# Patient Record
Sex: FEMALE | Race: WHITE | HISPANIC OR LATINO | Employment: UNEMPLOYED | URBAN - METROPOLITAN AREA
[De-identification: names, ages, dates, MRNs, and addresses within clinical notes are randomized per-mention and may not be internally consistent; named-entity substitution may affect disease eponyms.]

---

## 2024-09-05 ENCOUNTER — TELEPHONE (OUTPATIENT)
Dept: OBSTETRICS AND GYNECOLOGY | Facility: CLINIC | Age: 24
End: 2024-09-05

## 2024-09-05 NOTE — TELEPHONE ENCOUNTER
Caller: LISA GREGORIO    Relationship: SELF- WITH [employer]TER     Best call back number: [phone]    What is the best time to reach you: ANY    Who are you requesting to speak with (clinical staff, provider,  specific staff member): FRONT     What was the call regarding: NEW OB- LMP 7/3/24. PT WANTS FEMALE PROVIDER ONLY. FIRST AVAILABLE NOT UNTIL 9/27/24. NOT WITHIN TIMEFRAME PER RT. UNABLE TO ARM TRANSFER; REQUESTING CALL BACK TO SCHEDULE

## 2024-10-15 ENCOUNTER — OFFICE VISIT (OUTPATIENT)
Dept: OBSTETRICS AND GYNECOLOGY | Facility: CLINIC | Age: 24
End: 2024-10-15
Payer: MEDICAID

## 2024-10-15 ENCOUNTER — TELEPHONE (OUTPATIENT)
Dept: OBSTETRICS AND GYNECOLOGY | Facility: CLINIC | Age: 24
End: 2024-10-15

## 2024-10-15 ENCOUNTER — HOSPITAL ENCOUNTER (INPATIENT)
Facility: HOSPITAL | Age: 24
LOS: 3 days | Discharge: HOME | DRG: 770 | End: 2024-10-18
Attending: OBSTETRICS & GYNECOLOGY | Admitting: STUDENT IN AN ORGANIZED HEALTH CARE EDUCATION/TRAINING PROGRAM
Payer: MEDICAID

## 2024-10-15 VITALS
SYSTOLIC BLOOD PRESSURE: 107 MMHG | BODY MASS INDEX: 22.28 KG/M2 | WEIGHT: 147 LBS | HEART RATE: 124 BPM | DIASTOLIC BLOOD PRESSURE: 76 MMHG | HEIGHT: 68 IN

## 2024-10-15 DIAGNOSIS — O03.4 INCOMPLETE ABORTION: Primary | ICD-10-CM

## 2024-10-15 DIAGNOSIS — O03.4 INCOMPLETE ABORTION: ICD-10-CM

## 2024-10-15 LAB
ABO GROUP BLD: NORMAL
ALBUMIN SERPL-MCNC: 3.2 G/DL (ref 3.5–5.2)
ALBUMIN/GLOB SERPL: 0.8 G/DL
ALP SERPL-CCNC: 125 U/L (ref 39–117)
ALT SERPL W P-5'-P-CCNC: 81 U/L (ref 1–33)
ANION GAP SERPL CALCULATED.3IONS-SCNC: 11.7 MMOL/L (ref 5–15)
AST SERPL-CCNC: 106 U/L (ref 1–32)
BILIRUB SERPL-MCNC: 0.7 MG/DL (ref 0–1.2)
BLD GP AB SCN SERPL QL: NEGATIVE
BUN SERPL-MCNC: 10 MG/DL (ref 6–20)
BUN/CREAT SERPL: 12 (ref 7–25)
CALCIUM SPEC-SCNC: 9.2 MG/DL (ref 8.6–10.5)
CHLORIDE SERPL-SCNC: 101 MMOL/L (ref 98–107)
CO2 SERPL-SCNC: 22.3 MMOL/L (ref 22–29)
CREAT SERPL-MCNC: 0.83 MG/DL (ref 0.57–1)
D-LACTATE SERPL-SCNC: 0.8 MMOL/L (ref 0.5–2)
DEPRECATED RDW RBC AUTO: 39 FL (ref 37–54)
EGFRCR SERPLBLD CKD-EPI 2021: 101.1 ML/MIN/1.73
ERYTHROCYTE [DISTWIDTH] IN BLOOD BY AUTOMATED COUNT: 12.1 % (ref 12.3–15.4)
GLOBULIN UR ELPH-MCNC: 4.1 GM/DL
GLUCOSE SERPL-MCNC: 116 MG/DL (ref 65–99)
HCG INTACT+B SERPL-ACNC: 31.3 MIU/ML
HCT VFR BLD AUTO: 32.5 % (ref 34–46.6)
HGB BLD-MCNC: 11.1 G/DL (ref 12–15.9)
MCH RBC QN AUTO: 30.3 PG (ref 26.6–33)
MCHC RBC AUTO-ENTMCNC: 34.2 G/DL (ref 31.5–35.7)
MCV RBC AUTO: 88.8 FL (ref 79–97)
PLATELET # BLD AUTO: 282 10*3/MM3 (ref 140–450)
PMV BLD AUTO: 9.9 FL (ref 6–12)
POTASSIUM SERPL-SCNC: 2.9 MMOL/L (ref 3.5–5.2)
PROT SERPL-MCNC: 7.3 G/DL (ref 6–8.5)
RBC # BLD AUTO: 3.66 10*6/MM3 (ref 3.77–5.28)
RH BLD: POSITIVE
SODIUM SERPL-SCNC: 135 MMOL/L (ref 136–145)
T&S EXPIRATION DATE: NORMAL
WBC NRBC COR # BLD AUTO: 17.52 10*3/MM3 (ref 3.4–10.8)

## 2024-10-15 PROCEDURE — 25010000002 CEFTRIAXONE PER 250 MG: Performed by: OBSTETRICS & GYNECOLOGY

## 2024-10-15 PROCEDURE — 80053 COMPREHEN METABOLIC PANEL: CPT | Performed by: OBSTETRICS & GYNECOLOGY

## 2024-10-15 PROCEDURE — 87040 BLOOD CULTURE FOR BACTERIA: CPT | Performed by: STUDENT IN AN ORGANIZED HEALTH CARE EDUCATION/TRAINING PROGRAM

## 2024-10-15 PROCEDURE — 86900 BLOOD TYPING SEROLOGIC ABO: CPT | Performed by: OBSTETRICS & GYNECOLOGY

## 2024-10-15 PROCEDURE — 85027 COMPLETE CBC AUTOMATED: CPT | Performed by: OBSTETRICS & GYNECOLOGY

## 2024-10-15 PROCEDURE — 86901 BLOOD TYPING SEROLOGIC RH(D): CPT | Performed by: OBSTETRICS & GYNECOLOGY

## 2024-10-15 PROCEDURE — 25810000003 LACTATED RINGERS PER 1000 ML: Performed by: OBSTETRICS & GYNECOLOGY

## 2024-10-15 PROCEDURE — 84702 CHORIONIC GONADOTROPIN TEST: CPT | Performed by: OBSTETRICS & GYNECOLOGY

## 2024-10-15 PROCEDURE — 83605 ASSAY OF LACTIC ACID: CPT | Performed by: STUDENT IN AN ORGANIZED HEALTH CARE EDUCATION/TRAINING PROGRAM

## 2024-10-15 PROCEDURE — 86850 RBC ANTIBODY SCREEN: CPT | Performed by: OBSTETRICS & GYNECOLOGY

## 2024-10-15 PROCEDURE — 99221 1ST HOSP IP/OBS SF/LOW 40: CPT | Performed by: OBSTETRICS & GYNECOLOGY

## 2024-10-15 RX ORDER — LIDOCAINE HYDROCHLORIDE 10 MG/ML
0.5 INJECTION, SOLUTION INFILTRATION; PERINEURAL ONCE AS NEEDED
Status: CANCELLED | OUTPATIENT
Start: 2024-10-15

## 2024-10-15 RX ORDER — POTASSIUM CHLORIDE 750 MG/1
40 TABLET, FILM COATED, EXTENDED RELEASE ORAL EVERY 4 HOURS
Status: DISCONTINUED | OUTPATIENT
Start: 2024-10-15 | End: 2024-10-16

## 2024-10-15 RX ORDER — HYDROXYZINE HYDROCHLORIDE 25 MG/1
25 TABLET, FILM COATED ORAL EVERY 8 HOURS PRN
COMMUNITY
Start: 2024-10-09 | End: 2024-10-28

## 2024-10-15 RX ORDER — HYDROCODONE BITARTRATE AND ACETAMINOPHEN 5; 325 MG/1; MG/1
1 TABLET ORAL EVERY 6 HOURS PRN
Status: DISCONTINUED | OUTPATIENT
Start: 2024-10-15 | End: 2024-10-18

## 2024-10-15 RX ORDER — IBUPROFEN 800 MG/1
800 TABLET, FILM COATED ORAL EVERY 8 HOURS PRN
COMMUNITY
Start: 2024-10-09 | End: 2024-10-19

## 2024-10-15 RX ORDER — PSEUDOEPHEDRINE HCL 30 MG
100 TABLET ORAL 2 TIMES DAILY PRN
COMMUNITY
Start: 2024-10-09 | End: 2024-10-28

## 2024-10-15 RX ORDER — DOCUSATE SODIUM 100 MG/1
100 CAPSULE, LIQUID FILLED ORAL 2 TIMES DAILY PRN
Status: DISCONTINUED | OUTPATIENT
Start: 2024-10-15 | End: 2024-10-18 | Stop reason: HOSPADM

## 2024-10-15 RX ORDER — DOCUSATE SODIUM 100 MG/1
100 CAPSULE, LIQUID FILLED ORAL 2 TIMES DAILY PRN
Status: CANCELLED | OUTPATIENT
Start: 2024-10-15

## 2024-10-15 RX ORDER — SODIUM CHLORIDE 0.9 % (FLUSH) 0.9 %
10 SYRINGE (ML) INJECTION AS NEEDED
Status: DISCONTINUED | OUTPATIENT
Start: 2024-10-15 | End: 2024-10-18 | Stop reason: HOSPADM

## 2024-10-15 RX ORDER — BISACODYL 10 MG
10 SUPPOSITORY, RECTAL RECTAL DAILY PRN
Status: CANCELLED | OUTPATIENT
Start: 2024-10-15

## 2024-10-15 RX ORDER — ONDANSETRON 4 MG/1
8 TABLET, ORALLY DISINTEGRATING ORAL EVERY 8 HOURS PRN
Status: DISCONTINUED | OUTPATIENT
Start: 2024-10-15 | End: 2024-10-18 | Stop reason: HOSPADM

## 2024-10-15 RX ORDER — SODIUM CHLORIDE 0.9 % (FLUSH) 0.9 %
10 SYRINGE (ML) INJECTION AS NEEDED
Status: CANCELLED | OUTPATIENT
Start: 2024-10-15

## 2024-10-15 RX ORDER — DEXTROSE MONOHYDRATE, SODIUM CHLORIDE, AND POTASSIUM CHLORIDE 50; 1.49; 4.5 G/1000ML; G/1000ML; G/1000ML
100 INJECTION, SOLUTION INTRAVENOUS CONTINUOUS
Status: DISPENSED | OUTPATIENT
Start: 2024-10-15 | End: 2024-10-16

## 2024-10-15 RX ORDER — SODIUM CHLORIDE, SODIUM LACTATE, POTASSIUM CHLORIDE, CALCIUM CHLORIDE 600; 310; 30; 20 MG/100ML; MG/100ML; MG/100ML; MG/100ML
100 INJECTION, SOLUTION INTRAVENOUS CONTINUOUS
Status: CANCELLED | OUTPATIENT
Start: 2024-10-15 | End: 2024-10-16

## 2024-10-15 RX ORDER — FERROUS SULFATE 325(65) MG
325 TABLET ORAL
COMMUNITY
Start: 2024-10-11 | End: 2024-10-28

## 2024-10-15 RX ORDER — ONDANSETRON 2 MG/ML
4 INJECTION INTRAMUSCULAR; INTRAVENOUS EVERY 8 HOURS PRN
Status: CANCELLED | OUTPATIENT
Start: 2024-10-15

## 2024-10-15 RX ORDER — LIDOCAINE HYDROCHLORIDE 10 MG/ML
0.5 INJECTION, SOLUTION INFILTRATION; PERINEURAL ONCE AS NEEDED
Status: DISCONTINUED | OUTPATIENT
Start: 2024-10-15 | End: 2024-10-18 | Stop reason: HOSPADM

## 2024-10-15 RX ORDER — ONDANSETRON 2 MG/ML
4 INJECTION INTRAMUSCULAR; INTRAVENOUS EVERY 8 HOURS PRN
Status: DISCONTINUED | OUTPATIENT
Start: 2024-10-15 | End: 2024-10-18 | Stop reason: HOSPADM

## 2024-10-15 RX ORDER — PNV NO.121/IRON/FOLIC ACID 28MG-0.8MG
1 TABLET ORAL DAILY
COMMUNITY
Start: 2024-10-09 | End: 2024-10-28

## 2024-10-15 RX ORDER — SODIUM CHLORIDE 9 MG/ML
40 INJECTION, SOLUTION INTRAVENOUS AS NEEDED
Status: ACTIVE | OUTPATIENT
Start: 2024-10-15 | End: 2024-10-16

## 2024-10-15 RX ORDER — IBUPROFEN 600 MG/1
600 TABLET, FILM COATED ORAL EVERY 6 HOURS PRN
Status: DISCONTINUED | OUTPATIENT
Start: 2024-10-15 | End: 2024-10-18 | Stop reason: HOSPADM

## 2024-10-15 RX ORDER — BISACODYL 10 MG
10 SUPPOSITORY, RECTAL RECTAL DAILY PRN
Status: DISCONTINUED | OUTPATIENT
Start: 2024-10-15 | End: 2024-10-18 | Stop reason: HOSPADM

## 2024-10-15 RX ORDER — SODIUM CHLORIDE, SODIUM LACTATE, POTASSIUM CHLORIDE, CALCIUM CHLORIDE 600; 310; 30; 20 MG/100ML; MG/100ML; MG/100ML; MG/100ML
100 INJECTION, SOLUTION INTRAVENOUS CONTINUOUS
Status: DISCONTINUED | OUTPATIENT
Start: 2024-10-15 | End: 2024-10-15

## 2024-10-15 RX ORDER — HYDROMORPHONE HYDROCHLORIDE 1 MG/ML
0.5 INJECTION, SOLUTION INTRAMUSCULAR; INTRAVENOUS; SUBCUTANEOUS
Status: DISCONTINUED | OUTPATIENT
Start: 2024-10-15 | End: 2024-10-18

## 2024-10-15 RX ORDER — IBUPROFEN 200 MG
600 TABLET ORAL EVERY 6 HOURS PRN
Status: CANCELLED | OUTPATIENT
Start: 2024-10-15

## 2024-10-15 RX ORDER — SODIUM CHLORIDE 0.9 % (FLUSH) 0.9 %
10 SYRINGE (ML) INJECTION EVERY 12 HOURS SCHEDULED
Status: DISCONTINUED | OUTPATIENT
Start: 2024-10-15 | End: 2024-10-18 | Stop reason: HOSPADM

## 2024-10-15 RX ORDER — ONDANSETRON 4 MG/1
8 TABLET, ORALLY DISINTEGRATING ORAL EVERY 8 HOURS PRN
Status: CANCELLED | OUTPATIENT
Start: 2024-10-15

## 2024-10-15 RX ORDER — SENNOSIDES 8.6 MG
650 CAPSULE ORAL EVERY 8 HOURS PRN
COMMUNITY
Start: 2024-10-09 | End: 2024-10-18 | Stop reason: HOSPADM

## 2024-10-15 RX ORDER — SODIUM CHLORIDE 0.9 % (FLUSH) 0.9 %
10 SYRINGE (ML) INJECTION EVERY 12 HOURS SCHEDULED
Status: CANCELLED | OUTPATIENT
Start: 2024-10-15

## 2024-10-15 RX ORDER — SODIUM CHLORIDE 9 MG/ML
40 INJECTION, SOLUTION INTRAVENOUS AS NEEDED
Status: CANCELLED | OUTPATIENT
Start: 2024-10-15 | End: 2024-10-16

## 2024-10-15 RX ADMIN — POTASSIUM CHLORIDE, DEXTROSE MONOHYDRATE AND SODIUM CHLORIDE 100 ML/HR: 150; 5; 450 INJECTION, SOLUTION INTRAVENOUS at 22:13

## 2024-10-15 RX ADMIN — CEFTRIAXONE 2000 MG: 2 INJECTION, POWDER, FOR SOLUTION INTRAMUSCULAR; INTRAVENOUS at 19:55

## 2024-10-15 RX ADMIN — Medication 10 ML: at 20:02

## 2024-10-15 RX ADMIN — IBUPROFEN 600 MG: 600 TABLET, FILM COATED ORAL at 17:11

## 2024-10-15 RX ADMIN — SODIUM CHLORIDE, POTASSIUM CHLORIDE, SODIUM LACTATE AND CALCIUM CHLORIDE 100 ML/HR: 600; 310; 30; 20 INJECTION, SOLUTION INTRAVENOUS at 16:37

## 2024-10-15 RX ADMIN — POTASSIUM CHLORIDE 40 MEQ: 750 TABLET, EXTENDED RELEASE ORAL at 22:14

## 2024-10-15 NOTE — H&P
H&P Note    Patient Identification:  Name: Martha Mckeon  Age: 24 y.o.  Sex: female  :  2000  MRN: [id number]                       Chief Complaint: Pelvic pain    History of Present Illness:   24-year-old lady who presented to the office after an episode of bleeding and pelvic pain.  She had a fever to 101 last night.  She was evaluated in the office by Dr. Monterroso.  There, her physical examination was concerning for endometritis or retained products.  The patient had significant tenderness overlying the uterus and her cervix was dilated between 1 and 2 cm.      I reviewed the patient's history.  On , she presented to the [facility] with pelvic pain and cramps.  There, she delivered a previable 15-week fetus.  The delivery note makes reference to easy delivery of the placenta after this.  After discharge, the patient had 1 to 2 days of heavy bleeding followed by resolution of her symptoms.  Yesterday, she experienced an additional episode of bleeding along with severe pain and fever.  This evening, the patient is awake and alert.  She denies any pain at this time, though she did have significant pain earlier.  Denies heavy bleeding, though she did have an episode earlier in the day.    Problem List:  [unfilled]  Past Medical History:  History reviewed. No pertinent past medical history.  Past Surgical History:  History reviewed. No pertinent surgical history.   Home Meds:  Medications Prior to Admission   Medication Sig Dispense Refill Last Dose/Taking    acetaminophen (TYLENOL) 650 MG 8 hr tablet Take 1 tablet by mouth Every 8 (Eight) Hours As Needed for Mild Pain, Moderate Pain or Headache.   Taking As Needed    docusate sodium 100 MG capsule Take 1 capsule by mouth 2 (Two) Times a Day As Needed (constipation).   Taking As Needed    FeroSul 325 (65 Fe) MG tablet Take 1 tablet by mouth Daily With Breakfast.   Taking    hydrOXYzine (ATARAX) 25 MG tablet Take 1 tablet by mouth Every 8  (Eight) Hours As Needed.   Taking As Needed    ibuprofen (ADVIL,MOTRIN) 800 MG tablet Take 1 tablet by mouth Every 8 (Eight) Hours As Needed.   Taking As Needed    Prenatal Vit-Fe Fumarate-FA ( Prenatal Multivitamins) 28-0.8 MG tablet Take 1 tablet by mouth Daily.   Taking     Current Meds:   [unfilled]  Allergies:  Allergies   Allergen Reactions    Penicillins Rash     Immunizations:  Immunization History   Administered Date(s) Administered    BCG 2000    Hepatitis B Adult/Adolescent IM 2000, 2000, 2000    HiB 2000, 2000, 02/15/2001, 2001    IPV 2001, 2001    MMR 2000, 2000, 2000, 2001    Meningococcal Polysaccharide 2000, 2000    Typhoid, Unspecified 10/25/2017     Social History:   Social History     Tobacco Use    Smoking status: Never    Smokeless tobacco: Never   Substance Use Topics    Alcohol use: Not Currently      Family History:  Family History   Problem Relation Age of Onset    No Known Problems Father     No Known Problems Mother     Breast cancer Neg Hx     Ovarian cancer Neg Hx     Uterine cancer Neg Hx     Colon cancer Neg Hx     Deep vein thrombosis Neg Hx     Pulmonary embolism Neg Hx         Review of Systems  This is positive for abdominal and pelvic pain.  Positive for bleeding, currently resolved.  Positive for fever, currently resolved.  Negative for nausea and vomiting.  All other systems are reviewed and are negative.    Objective:  tMax 24 hrs: Temp (24hrs), Av.2 °F (36.2 °C), Min:97.2 °F (36.2 °C), Max:97.2 °F (36.2 °C)    Vitals Ranges:   Temp:  [97.2 °F (36.2 °C)] 97.2 °F (36.2 °C)  Heart Rate:  [105-124] 107  Resp:  [18] 18  BP: (101-113)/(70-79) 101/70  Intake and Output Last 3 Shifts:   No intake/output data recorded.    Exam:     General Appearance:    Alert, cooperative, no distress, appears stated age   Head:    Normocephalic, without obvious abnormality, atraumatic   Back:     Symmetric,  no curvature, ROM normal, no CVA tenderness   Lungs:     Clear to auscultation bilaterally, respirations unlabored   Chest Wall:    No tenderness or deformity    Heart:    Regular rate and rhythm, S1 and S2 normal, no murmur, rub   or gallop       Abdomen:   Soft, nondistended.  There is no upper abdominal tenderness.  No right upper quadrant or left upper quadrant tenderness.  No CVA tenderness.  There is moderate tenderness overlying the uterine fundus and suprapubic area.   Genitalia:  Pelvic examination this afternoon by Dr. Verma showed tenderness overlying the uterus.  The cervix was open approximately 1 cm with some motion tenderness.   Rectal:  Not examined today   Extremities:   Extremities normal, atraumatic, no cyanosis or edema   Skin:   Skin color, texture, turgor normal, no rashes or lesions   Lymph nodes:   Cervical, supraclavicular, and axillary nodes normal       Data Review:    Lab Results (last 24 hours)       ** No results found for the last 24 hours. **          Assessment:    Incomplete       6 days postdelivery of a previable 15-week fetus.  The patient has endometritis with possible retained products of conception.  I counseled the patient and answered her questions.  A Ecuadorean  via iPad was instrumental in communication.  We discussed the patient's current condition and its pathophysiology along with my plan for management.  I recommend IV antibiotics.  The patient reports a penicillin allergy.  For this reason, we will use ceftriaxone.  Also, we will check a CBC, chemistry and blood cultures.  We will continue to observe the patient in the hospital overnight.  Labs will be repeated in the morning.  After the patient's endometritis has been adequately treated, if it appears that retained products are part of the patient's problem, either a D&C or Methergine could be used to evacuate the uterus.  If it appears that this is endometritis without retained products,  treatment with antibiotics could be sufficient.  I answered the patient's questions and she agrees with these recommendations.        Yasmani Long MD  10/15/2024

## 2024-10-15 NOTE — PROGRESS NOTES
"Subjective    is a 24 y.o. female following up from [facility]. LMP 2024. She started bleeding 10/09/2024. Still bleeding today with intense pain. She had a fever of 101 yesterday.     Chief Complaint   Patient presents with    Miscarriage        HPI    24 y.o.    LNMP 24, CGA 15 weeks  Started bleeding and pain last week   Confirmed miscarriage in ED last week per patient   Only records show visit earlier in pregnancy that confirmed IUP   Routine prenatal panel at this point   In severe pain and still bleeding     History reviewed. No pertinent past medical history.    History reviewed. No pertinent surgical history.    No current outpatient medications on file.    Allergies   Allergen Reactions    Penicillins Rash     Social History     Tobacco Use    Smoking status: Never   Vaping Use    Vaping status: Never Used   Substance Use Topics    Alcohol use: Not Currently    Drug use: Never     Family History   Problem Relation Age of Onset    No Known Problems Father     No Known Problems Mother     Breast cancer Neg Hx     Ovarian cancer Neg Hx     Uterine cancer Neg Hx     Colon cancer Neg Hx     Deep vein thrombosis Neg Hx     Pulmonary embolism Neg Hx            Review of Systems   Constitutional:  Positive for fever.   Respiratory:  Negative for chest tightness and shortness of breath.    Gastrointestinal:  Positive for abdominal pain.   Genitourinary:  Positive for pelvic pain and pelvic pressure.   All other systems reviewed and are negative.       Objective   /76   Pulse (!) 124   Ht 173 cm (68.11\")   Wt 66.7 kg (147 lb)   LMP 2024 (Exact Date)   BMI 22.28 kg/m²   Physical Exam  Constitutional:       General: She is in acute distress.      Appearance: She is well-developed and normal weight.   Genitourinary:      Vulva normal.      Right Labia: No lesions or Bartholin's cyst.     Left Labia: No lesions or Bartholin's cyst.     No inguinal adenopathy present in the right or left " side.     No vaginal discharge or bleeding.        Right Adnexa: not tender, not full and no mass present.     Left Adnexa: not tender, not full and no mass present.     Cervix is parous (open 1 cm).      Cervical motion tenderness present.      No cervical friability.      Uterus is enlarged and tender.      No uterine mass detected.     Uterus is anteverted.   HENT:      Head: Normocephalic and atraumatic.      Nose: Nose normal.   Eyes:      Conjunctiva/sclera: Conjunctivae normal.      Pupils: Pupils are equal, round, and reactive to light.   Neck:      Thyroid: No thyromegaly.   Cardiovascular:      Rate and Rhythm: Normal rate and regular rhythm.      Heart sounds: Normal heart sounds. No murmur heard.  Pulmonary:      Effort: Pulmonary effort is normal. No respiratory distress.      Breath sounds: Normal breath sounds.   Abdominal:      General: Abdomen is flat. There is no distension.      Palpations: Abdomen is soft.      Tenderness: There is no abdominal tenderness.   Musculoskeletal:         General: No deformity. Normal range of motion.      Cervical back: Normal range of motion and neck supple.      Right lower leg: No edema.      Left lower leg: No edema.   Lymphadenopathy:      Lower Body: No right inguinal adenopathy. No left inguinal adenopathy.   Neurological:      Mental Status: She is alert and oriented to person, place, and time.   Skin:     General: Skin is warm and dry.      Findings: No erythema.   Psychiatric:         Behavior: Behavior normal.         Thought Content: Thought content normal.         Judgment: Judgment normal.   Vitals reviewed. Exam conducted with a chaperone present.          Assessment/Plan   Diagnoses and all orders for this visit:    1. Incomplete  (Primary)  -     sodium chloride 0.9 % flush 10 mL  -     sodium chloride 0.9 % flush 10 mL  -     sodium chloride 0.9 % infusion 40 mL  -     lidocaine (XYLOCAINE) 1 % injection 0.5 mL  -     ondansetron ODT  (ZOFRAN-ODT) disintegrating tablet 8 mg  -     ondansetron (ZOFRAN) injection 4 mg  -     docusate sodium (COLACE) capsule 100 mg  -     bisacodyl (DULCOLAX) suppository 10 mg  -     ibuprofen (ADVIL,MOTRIN) tablet 600 mg  -     lactated ringers infusion    Other orders  -     Admit To Obstetrics Inpatient; Standing  -     Code Status and Medical Interventions: CPR (Attempt to Resuscitate); Full Support; Standing  -     Place Sequential Compression Device; Standing  -     Maintain Sequential Compression Device; Standing  -     Vital Signs q 4 while awake; Standing  -     Notify Provider (Specified); Standing  -     Notify Provider of Tachysystole (Per Hospital Algorithm); Standing  -     Notify Provider if Membranes Ruptured, Bleeding Greater Than 1 Pad Per Hour, Fetal Heart Tone Abnormality or Severe Pain; Standing  -     CBC (No Diff); Standing  -     Type & Screen; Standing  -     Insert Peripheral IV; Standing  -     Saline Lock & Maintain IV Access; Standing  -     Up Ad Ramona; Standing  -     Diet: Regular/House; Fluid Consistency: Thin (IDDSI 0); Standing  -     Comprehensive Metabolic Panel; Standing  -     Comprehensive Metabolic Panel; Standing  -     hCG, Quantitative, Pregnancy; Standing    Exam very concerning   She is tender and in lower abdomen with cervix dilated and 1.8 cm on exam with heterogenous material   Suspect she could be septic ab in process with fever.     Recommended inpatient evaluation for IV antibiotics.   I am sending for direct admit with labs  It would not let me start antibiotics.     Would need to cool off before considering D&C of material in uterus, maybe can do misoprostol in meanwhile.     Generally concerned with overall clinical picture  Orders placed to start for Med/Surg bed and then will need to do IV antibiotics    Consider treatment from there.      I have notified my staff to make direct admit to the hospital.   I have notified my partners who are covering the hospital  today     Nicolás Verma MD   10/15/2024  13:46 EDT

## 2024-10-16 LAB
ALBUMIN SERPL-MCNC: 2.8 G/DL (ref 3.5–5.2)
ALBUMIN/GLOB SERPL: 0.7 G/DL
ALP SERPL-CCNC: 123 U/L (ref 39–117)
ALT SERPL W P-5'-P-CCNC: 87 U/L (ref 1–33)
ANION GAP SERPL CALCULATED.3IONS-SCNC: 7.3 MMOL/L (ref 5–15)
AST SERPL-CCNC: 101 U/L (ref 1–32)
BASOPHILS # BLD AUTO: 0.04 10*3/MM3 (ref 0–0.2)
BASOPHILS NFR BLD AUTO: 0.2 % (ref 0–1.5)
BILIRUB SERPL-MCNC: 0.5 MG/DL (ref 0–1.2)
BUN SERPL-MCNC: 7 MG/DL (ref 6–20)
BUN/CREAT SERPL: 9.9 (ref 7–25)
CALCIUM SPEC-SCNC: 8.9 MG/DL (ref 8.6–10.5)
CHLORIDE SERPL-SCNC: 105 MMOL/L (ref 98–107)
CO2 SERPL-SCNC: 24.7 MMOL/L (ref 22–29)
CREAT SERPL-MCNC: 0.71 MG/DL (ref 0.57–1)
D-LACTATE SERPL-SCNC: 0.6 MMOL/L (ref 0.5–2)
DEPRECATED RDW RBC AUTO: 39 FL (ref 37–54)
EGFRCR SERPLBLD CKD-EPI 2021: 121.9 ML/MIN/1.73
EOSINOPHIL # BLD AUTO: 0.06 10*3/MM3 (ref 0–0.4)
EOSINOPHIL NFR BLD AUTO: 0.4 % (ref 0.3–6.2)
ERYTHROCYTE [DISTWIDTH] IN BLOOD BY AUTOMATED COUNT: 12.2 % (ref 12.3–15.4)
GLOBULIN UR ELPH-MCNC: 4 GM/DL
GLUCOSE SERPL-MCNC: 102 MG/DL (ref 65–99)
HCT VFR BLD AUTO: 31.4 % (ref 34–46.6)
HGB BLD-MCNC: 10.8 G/DL (ref 12–15.9)
IMM GRANULOCYTES # BLD AUTO: 0.23 10*3/MM3 (ref 0–0.05)
IMM GRANULOCYTES NFR BLD AUTO: 1.4 % (ref 0–0.5)
LYMPHOCYTES # BLD AUTO: 1.6 10*3/MM3 (ref 0.7–3.1)
LYMPHOCYTES NFR BLD AUTO: 9.4 % (ref 19.6–45.3)
MCH RBC QN AUTO: 30.6 PG (ref 26.6–33)
MCHC RBC AUTO-ENTMCNC: 34.4 G/DL (ref 31.5–35.7)
MCV RBC AUTO: 89 FL (ref 79–97)
MONOCYTES # BLD AUTO: 1.21 10*3/MM3 (ref 0.1–0.9)
MONOCYTES NFR BLD AUTO: 7.1 % (ref 5–12)
NEUTROPHILS NFR BLD AUTO: 13.8 10*3/MM3 (ref 1.7–7)
NEUTROPHILS NFR BLD AUTO: 81.5 % (ref 42.7–76)
NRBC BLD AUTO-RTO: 0 /100 WBC (ref 0–0.2)
PLATELET # BLD AUTO: 270 10*3/MM3 (ref 140–450)
PMV BLD AUTO: 9.8 FL (ref 6–12)
POTASSIUM SERPL-SCNC: 3.9 MMOL/L (ref 3.5–5.2)
PROCALCITONIN SERPL-MCNC: 0.21 NG/ML (ref 0–0.25)
PROT SERPL-MCNC: 6.8 G/DL (ref 6–8.5)
RBC # BLD AUTO: 3.53 10*6/MM3 (ref 3.77–5.28)
SODIUM SERPL-SCNC: 137 MMOL/L (ref 136–145)
WBC NRBC COR # BLD AUTO: 16.94 10*3/MM3 (ref 3.4–10.8)

## 2024-10-16 PROCEDURE — 25010000002 ONDANSETRON PER 1 MG: Performed by: OBSTETRICS & GYNECOLOGY

## 2024-10-16 PROCEDURE — 25810000003 SODIUM CHLORIDE 0.9 % SOLUTION: Performed by: OBSTETRICS & GYNECOLOGY

## 2024-10-16 PROCEDURE — 85025 COMPLETE CBC W/AUTO DIFF WBC: CPT | Performed by: OBSTETRICS & GYNECOLOGY

## 2024-10-16 PROCEDURE — 25010000002 METRONIDAZOLE 500 MG/100ML SOLUTION: Performed by: INTERNAL MEDICINE

## 2024-10-16 PROCEDURE — 99223 1ST HOSP IP/OBS HIGH 75: CPT | Performed by: INTERNAL MEDICINE

## 2024-10-16 PROCEDURE — 25010000002 CEFTRIAXONE PER 250 MG: Performed by: INTERNAL MEDICINE

## 2024-10-16 PROCEDURE — 25010000002 METRONIDAZOLE 500 MG/100ML SOLUTION: Performed by: OTOLARYNGOLOGY

## 2024-10-16 PROCEDURE — 80053 COMPREHEN METABOLIC PANEL: CPT | Performed by: OBSTETRICS & GYNECOLOGY

## 2024-10-16 PROCEDURE — 99232 SBSQ HOSP IP/OBS MODERATE 35: CPT | Performed by: OBSTETRICS & GYNECOLOGY

## 2024-10-16 PROCEDURE — 83605 ASSAY OF LACTIC ACID: CPT | Performed by: OBSTETRICS & GYNECOLOGY

## 2024-10-16 PROCEDURE — 84145 PROCALCITONIN (PCT): CPT | Performed by: INTERNAL MEDICINE

## 2024-10-16 RX ORDER — METHYLERGONOVINE MALEATE 0.2 MG/1
200 TABLET ORAL EVERY 6 HOURS SCHEDULED
Status: COMPLETED | OUTPATIENT
Start: 2024-10-16 | End: 2024-10-17

## 2024-10-16 RX ORDER — METRONIDAZOLE 500 MG/100ML
500 INJECTION, SOLUTION INTRAVENOUS EVERY 8 HOURS
Status: COMPLETED | OUTPATIENT
Start: 2024-10-16 | End: 2024-10-18

## 2024-10-16 RX ORDER — METRONIDAZOLE 500 MG/100ML
500 INJECTION, SOLUTION INTRAVENOUS EVERY 12 HOURS
Status: DISCONTINUED | OUTPATIENT
Start: 2024-10-16 | End: 2024-10-16

## 2024-10-16 RX ADMIN — METRONIDAZOLE 500 MG: 500 INJECTION, SOLUTION INTRAVENOUS at 12:32

## 2024-10-16 RX ADMIN — METHYLERGONOVINE MALEATE 200 MCG: 0.2 TABLET ORAL at 18:28

## 2024-10-16 RX ADMIN — CEFTRIAXONE 2000 MG: 2 INJECTION, POWDER, FOR SOLUTION INTRAMUSCULAR; INTRAVENOUS at 19:22

## 2024-10-16 RX ADMIN — SODIUM CHLORIDE 500 ML: 9 INJECTION, SOLUTION INTRAVENOUS at 03:36

## 2024-10-16 RX ADMIN — ONDANSETRON 4 MG: 2 INJECTION INTRAMUSCULAR; INTRAVENOUS at 03:40

## 2024-10-16 RX ADMIN — Medication 10 ML: at 20:13

## 2024-10-16 RX ADMIN — METRONIDAZOLE 500 MG: 500 INJECTION, SOLUTION INTRAVENOUS at 20:13

## 2024-10-16 RX ADMIN — IBUPROFEN 600 MG: 600 TABLET, FILM COATED ORAL at 14:30

## 2024-10-16 RX ADMIN — IBUPROFEN 600 MG: 600 TABLET, FILM COATED ORAL at 02:57

## 2024-10-16 RX ADMIN — METRONIDAZOLE 500 MG: 500 INJECTION, SOLUTION INTRAVENOUS at 04:42

## 2024-10-16 RX ADMIN — METHYLERGONOVINE MALEATE 200 MCG: 0.2 TABLET ORAL at 12:33

## 2024-10-16 RX ADMIN — POTASSIUM CHLORIDE 20 MEQ: 750 TABLET, EXTENDED RELEASE ORAL at 02:57

## 2024-10-16 NOTE — PLAN OF CARE
Goal Outcome Evaluation:           Progress: improving  Outcome Evaluation: spoke with pt via  ipad, speaks Romansh only, ID consulted, iv abx given as ordered, Methergine given - pt passed some brown/bloody draiange and some tissue, iv saline locked, tolerating regular diet, Motrin given for cramping x1 with relief, vss and afebrile

## 2024-10-16 NOTE — PLAN OF CARE
Goal Outcome Evaluation:           Progress: no change  Outcome Evaluation: temp max of 101F.  Motrin given. Blood culture collected last night.  Started on IV rocephin. Dr Dos Santos added Flagyl after she developed fever early this AM.  lactic was WNL.  KCL level last night was 2.9 , unable to finish doses because of nausea but her KCL level this AM came up to 3.9 this am.  500 cc bolus also given.  minimal amount of vaginal bleeding. denies pain  Pt is Comoran speaking.

## 2024-10-16 NOTE — PAYOR COMM NOTE
"Lisa Gregorio (24 y.o. Female)    PLEASE SEE ATTACHED FOR INPT AUTH  REF#[id number]  PLEASE CALL EDMOND COHN RN/ DEPT [phone] FAX  DEPT [phone]  THANK YOU   EDMOND COHN RN  [facility]      Date of Birth   2000    Social Security Number       Address   [address] [facility] [address]    Home Phone   [phone]    MRN   [id number]       Jehovah's witness   None    Marital Status   Single                            Admission Date   10/15/24    Admission Type   Urgent    Admitting Provider   Emily Martinez MD    Attending Provider   Nicolás Verma MD    Department, Room/Bed   [address], \[facility] [place] [facility]\""/1       Discharge Date       Discharge Disposition       Discharge Destination                                 Attending Provider: Nicolás Verma MD    Allergies: Penicillins    Isolation: None   Infection: None   Code Status: CPR    Ht: 173 cm (68.11\")   Wt: 66.9 kg (147 lb 7.8 oz)    Admission Cmt: None   Principal Problem: Incomplete  [O03.4]                   Active Insurance as of 10/15/2024       Primary Coverage       Payor Plan Insurance Group Employer/Plan Group    [employer]HE[employer] MEDICAID [place] MEDICAID [id number]       Payor Plan Address Payor Plan Phone Number Payor Plan Fax Number Effective Dates    [address] [address] [phone]  2024 - None Entered    [facility] [id number]         Subscriber Name Subscriber Birth Date Member ID       LISA GREGORIO 2000 [id number]                     Emergency Contacts        (Rel.) Home Phone Work Phone Mobile Phone    SUSHANT SOUTH (Friend) [phone] -- --              [place]: NP [id number]  Tax ID [id number]     History & Physical        Yasmani Long MD at 10/15/24 1916          H&P Note    Patient Identification:  Name: Lisa Gregorio  Age: 24 y.o.  Sex: female  :  2000  MRN: [id number]                       Chief Complaint: " Pelvic pain    History of Present Illness:   24-year-old lady who presented to the office after an episode of bleeding and pelvic pain.  She had a fever to 101 last night.  She was evaluated in the office by Dr. Monterroso.  There, her physical examination was concerning for endometritis or retained products.  The patient had significant tenderness overlying the uterus and her cervix was dilated between 1 and 2 cm.      I reviewed the patient's history.  On October 9, she presented to the [facility] [place] [place] with pelvic pain and cramps.  There, she delivered a previable 15-week fetus.  The delivery note makes reference to easy delivery of the placenta after this.  After discharge, the patient had 1 to 2 days of heavy bleeding followed by resolution of her symptoms.  Yesterday, she experienced an additional episode of bleeding along with severe pain and fever.  This evening, the patient is awake and alert.  She denies any pain at this time, though she did have significant pain earlier.  Denies heavy bleeding, though she did have an episode earlier in the day.    Problem List:  [unfilled]  Past Medical History:  History reviewed. No pertinent past medical history.  Past Surgical History:  History reviewed. No pertinent surgical history.   Home Meds:  Medications Prior to Admission   Medication Sig Dispense Refill Last Dose/Taking    acetaminophen (TYLENOL) 650 MG 8 hr tablet Take 1 tablet by mouth Every 8 (Eight) Hours As Needed for Mild Pain, Moderate Pain or Headache.   Taking As Needed    docusate sodium 100 MG capsule Take 1 capsule by mouth 2 (Two) Times a Day As Needed (constipation).   Taking As Needed    FeroSul 325 (65 Fe) MG tablet Take 1 tablet by mouth Daily With Breakfast.   Taking    hydrOXYzine (ATARAX) 25 MG tablet Take 1 tablet by mouth Every 8 (Eight) Hours As Needed.   Taking As Needed    ibuprofen (ADVIL,MOTRIN) 800 MG tablet Take 1 tablet by mouth Every 8 (Eight) Hours As Needed.   Taking As Needed     Prenatal Vit-Fe Fumarate-FA ( Prenatal Multivitamins) 28-0.8 MG tablet Take 1 tablet by mouth Daily.   Taking     Current Meds:   [unfilled]  Allergies:  Allergies   Allergen Reactions    Penicillins Rash     Immunizations:  Immunization History   Administered Date(s) Administered    BCG 2000    Hepatitis B Adult/Adolescent IM 2000, 2000, 2000    HiB 2000, 2000, 02/15/2001, 2001    IPV 2001, 2001    MMR 2000, 2000, 2000, 2001    Meningococcal Polysaccharide 2000, 2000    Typhoid, Unspecified 10/25/2017     Social History:   Social History     Tobacco Use    Smoking status: Never    Smokeless tobacco: Never   Substance Use Topics    Alcohol use: Not Currently      Family History:  Family History   Problem Relation Age of Onset    No Known Problems Father     No Known Problems Mother     Breast cancer Neg Hx     Ovarian cancer Neg Hx     Uterine cancer Neg Hx     Colon cancer Neg Hx     Deep vein thrombosis Neg Hx     Pulmonary embolism Neg Hx         Review of Systems  This is positive for abdominal and pelvic pain.  Positive for bleeding, currently resolved.  Positive for fever, currently resolved.  Negative for nausea and vomiting.  All other systems are reviewed and are negative.    Objective:  tMax 24 hrs: Temp (24hrs), Av.2 °F (36.2 °C), Min:97.2 °F (36.2 °C), Max:97.2 °F (36.2 °C)    Vitals Ranges:   Temp:  [97.2 °F (36.2 °C)] 97.2 °F (36.2 °C)  Heart Rate:  [105-124] 107  Resp:  [18] 18  BP: (101-113)/(70-79) 101/70  Intake and Output Last 3 Shifts:   No intake/output data recorded.    Exam:     General Appearance:    Alert, cooperative, no distress, appears stated age   Head:    Normocephalic, without obvious abnormality, atraumatic   Back:     Symmetric, no curvature, ROM normal, no CVA tenderness   Lungs:     Clear to auscultation bilaterally, respirations unlabored   Chest Wall:    No tenderness or deformity     Heart:    Regular rate and rhythm, S1 and S2 normal, no murmur, rub   or gallop       Abdomen:   Soft, nondistended.  There is no upper abdominal tenderness.  No right upper quadrant or left upper quadrant tenderness.  No CVA tenderness.  There is moderate tenderness overlying the uterine fundus and suprapubic area.   Genitalia:  Pelvic examination this afternoon by Dr. Verma showed tenderness overlying the uterus.  The cervix was open approximately 1 cm with some motion tenderness.   Rectal:  Not examined today   Extremities:   Extremities normal, atraumatic, no cyanosis or edema   Skin:   Skin color, texture, turgor normal, no rashes or lesions   Lymph nodes:   Cervical, supraclavicular, and axillary nodes normal       Data Review:    Lab Results (last 24 hours)       ** No results found for the last 24 hours. **          Assessment:    Incomplete       6 days postdelivery of a previable 15-week fetus.  The patient has endometritis with possible retained products of conception.  I counseled the patient and answered her questions.  A Romansh  via iPad was instrumental in communication.  We discussed the patient's current condition and its pathophysiology along with my plan for management.  I recommend IV antibiotics.  The patient reports a penicillin allergy.  For this reason, we will use ceftriaxone.  Also, we will check a CBC, chemistry and blood cultures.  We will continue to observe the patient in the hospital overnight.  Labs will be repeated in the morning.  After the patient's endometritis has been adequately treated, if it appears that retained products are part of the patient's problem, either a D&C or Methergine could be used to evacuate the uterus.  If it appears that this is endometritis without retained products, treatment with antibiotics could be sufficient.  I answered the patient's questions and she agrees with these recommendations.        Yasmani Long,  MD  10/15/2024     Electronically signed by Yasmani Long MD at 10/15/24 1922       Emergency Department Notes    No notes of this type exist for this encounter.       Medication Administration Report for Martha Mckeon as of 10/16/24 through 10/16/24    Given  Hold  Not Given  Due  Canceled Entry  Other Actions  Time  Time  (Time)  Time  Time-Action    Discontinued  Completed  Future  MAR Hold  Linked    Medications  10/16/24    bisacodyl (DULCOLAX) suppository 10 mg  Dose: 10 mg  Freq: Daily PRN Route: RE  PRN Reason: Constipation  Start: 10/15/24 1612    Hold for diarrhea  cefTRIAXone (ROCEPHIN) 2,000 mg in sodium chloride 0.9 % 100 mL MBP  Dose: 2,000 mg  Freq: Every 24 Hours Route: IV  Indications Comment: endometritis  Start: 10/2000 End: 10/19/24 1147    Admin Instructions:  Swallow whole.  Do not open, crush, or chew capsule.    HYDROcodone-acetaminophen (NORCO) 5-325 MG per tablet 1 tablet  Dose: 1 tablet  Freq: Every 6 Hours PRN Route: PO  PRN Reason: Moderate Pain  Start: 10/15/24 1715 End: 10/20/24 1714    Based on patient request - if ordered for moderate or severe pain, provider allows for administration of a medication prescribed for a lower pain scale.  [JEROD]    Do not exceed 4 grams of acetaminophen in a 24 hr period. Max dose of 2gm for AST/ALT greater than 120 units/L        If given for pain, use the following pain scale:   Mild Pain = Pain Score of 1-3, CPOT 1-2  Moderate Pain = Pain Score of 4-6, CPOT 3-4  Severe Pain = Pain Score of 7-10, CPOT 5-8    HYDROmorphone (DILAUDID) injection 0.5 mg  Dose: 0.5 mg  Freq: Every 2 Hours PRN Route: IV  PRN Reason: Severe Pain  Start: 10/15/24 1715 End: 10/20/24 1714  Admin Instructions:     Physician Progress Notes (last 24 hours)        Yasmani Long MD at 10/16/24 0905               DAILY PROGRESS NOTE    Patient Identification:  Name: Martha Mckeon  Age: 24 y.o.  Sex: female  :  2000  MRN: [id number]                Subjective:  Interval History: 24-year-old lady was admitted 6 days out from a spontaneous vaginal delivery of a viable 15-week fetus.  She had pain and fever.  Concern was endometritis versus endometritis with retained products of conception.      The patient was admitted and started on IV antibiotics.  She had a fever to 101 at 2:00 this morning.  She is now afebrile.  She reports passing some tissue yesterday afternoon with minimal bleeding since then.  Her pain has improved.      Objective:    Scheduled Meds:cefTRIAXone, 2,000 mg, Intravenous, Q24H  methylergonovine, 200 mcg, Oral, Q6H  metroNIDAZOLE, 500 mg, Intravenous, Q12H  sodium chloride, 10 mL, Intravenous, Q12H      Continuous Infusions:   PRN Meds:  bisacodyl    docusate sodium    HYDROcodone-acetaminophen    HYDROmorphone    ibuprofen    influenza vaccine    lidocaine    ondansetron ODT **OR** ondansetron    Potassium Replacement - Follow Nurse / BPA Driven Protocol    sodium chloride    Vital signs in last 24 hours:  Temp:  [97.2 °F (36.2 °C)-101 °F (38.3 °C)] 98.9 °F (37.2 °C)  Heart Rate:  [] 97  Resp:  [18] 18  BP: ()/(59-79) 96/59    Intake/Output:    Intake/Output Summary (Last 24 hours) at 10/16/2024 0938  Last data filed at 10/16/2024 0933  Gross per 24 hour   Intake 1884.33 ml   Output 1020 ml   Net 864.33 ml       Exam:  General Appearance:    Alert, cooperative, no distress, appears stated age   Lungs:     Clear to auscultation bilaterally, respirations unlabored    Heart:    Regular rate and rhythm, S1 and S2 normal, no murmur, rub   or gallop   Abdomen:   Soft, nondistended.  There is no epigastric tenderness.  No right or left lower quadrant tenderness.    Pelvic examination reveals normal female external genitalia.  There is minimal old blood in the vaginal vault.  The cervix is closed.  The uterus is slightly enlarged and mildly tender.   Extremities: Equal in size   Skin:   Skin color, texture, turgor normal, no rashes  or lesions        Data Review:    Lab Results (last 24 hours)       Procedure Component Value Units Date/Time    Comprehensive Metabolic Panel [[id number]]  (Abnormal) Collected: 10/16/24 0333    Specimen: Blood Updated: 10/16/24 0423     Glucose 102 mg/dL      BUN 7 mg/dL      Creatinine 0.71 mg/dL      Sodium 137 mmol/L      Potassium 3.9 mmol/L      Chloride 105 mmol/L      CO2 24.7 mmol/L      Calcium 8.9 mg/dL      Total Protein 6.8 g/dL      Albumin 2.8 g/dL      ALT (SGPT) 87 U/L      AST (SGOT) 101 U/L      Alkaline Phosphatase 123 U/L      Total Bilirubin 0.5 mg/dL      Globulin 4.0 gm/dL      A/G Ratio 0.7 g/dL      BUN/Creatinine Ratio 9.9     Anion Gap 7.3 mmol/L      eGFR 121.9 mL/min/1.73     Narrative:      GFR Normal >60  Chronic Kidney Disease <60  Kidney Failure <15      Lactic Acid, Plasma [[id number]]  (Normal) Collected: 10/16/24 0333    Specimen: Blood Updated: 10/16/24 0418     Lactate 0.6 mmol/L     CBC & Differential [[id number]]  (Abnormal) Collected: 10/16/24 0333    Specimen: Blood Updated: 10/16/24 0403    Narrative:      The following orders were created for panel order CBC & Differential.  Procedure                               Abnormality         Status                     ---------                               -----------         ------                     CBC Auto Differential[[id number]]        Abnormal            Final result                 Please view results for these tests on the individual orders.    CBC Auto Differential [[id number]]  (Abnormal) Collected: 10/16/24 0333    Specimen: Blood Updated: 10/16/24 0403     WBC 16.94 10*3/mm3      RBC 3.53 10*6/mm3      Hemoglobin 10.8 g/dL      Hematocrit 31.4 %      MCV 89.0 fL      MCH 30.6 pg      MCHC 34.4 g/dL      RDW 12.2 %      RDW-SD 39.0 fl      MPV 9.8 fL      Platelets 270 10*3/mm3      Neutrophil % 81.5 %      Lymphocyte % 9.4 %      Monocyte % 7.1 %      Eosinophil % 0.4 %      Basophil % 0.2 %      Immature Grans % 1.4 %       Neutrophils, Absolute 13.80 10*3/mm3      Lymphocytes, Absolute 1.60 10*3/mm3      Monocytes, Absolute 1.21 10*3/mm3      Eosinophils, Absolute 0.06 10*3/mm3      Basophils, Absolute 0.04 10*3/mm3      Immature Grans, Absolute 0.23 10*3/mm3      nRBC 0.0 /100 WBC     hCG, Quantitative, Pregnancy [[id number]] Collected: 10/15/24 1951    Specimen: Blood Updated: 10/15/24 2031     HCG Quantitative 31.30 mIU/mL     Narrative:      HCG Ranges by Gestational Age    Females - non-pregnant premenopausal   </= 1mIU/mL HCG  Females - postmenopausal               </= 7mIU/mL HCG    3 Weeks       5.4   -      72 mIU/mL  4 Weeks      10.2   -     708 mIU/mL  5 Weeks       217   -   8,245 mIU/mL  6 Weeks       152   -  32,177 mIU/mL  7 Weeks     4,059   - 153,767 mIU/mL  8 Weeks    31,366   - 149,094 mIU/mL  9 Weeks    59,109   - 135,901 mIU/mL  10 Weeks   44,186   - 170,409 mIU/mL  12 Weeks   27,107   - 201,615 mIU/mL  14 Weeks   24,302   -  93,646 mIU/mL  15 Weeks   12,540   -  69,747 mIU/mL  16 Weeks    8,904   -  55,332 mIU/mL  17 Weeks    8,240   -  51,793 mIU/mL  18 Weeks    9,649   -  55,271 mIU/mL    Results may be falsely decreased if patient taking Biotin.      Comprehensive Metabolic Panel [[id number]]  (Abnormal) Collected: 10/15/24 1951    Specimen: Blood Updated: 10/15/24 2029     Glucose 116 mg/dL      BUN 10 mg/dL      Creatinine 0.83 mg/dL      Sodium 135 mmol/L      Potassium 2.9 mmol/L      Chloride 101 mmol/L      CO2 22.3 mmol/L      Calcium 9.2 mg/dL      Total Protein 7.3 g/dL      Albumin 3.2 g/dL      ALT (SGPT) 81 U/L      AST (SGOT) 106 U/L      Alkaline Phosphatase 125 U/L      Total Bilirubin 0.7 mg/dL      Globulin 4.1 gm/dL      A/G Ratio 0.8 g/dL      BUN/Creatinine Ratio 12.0     Anion Gap 11.7 mmol/L      eGFR 101.1 mL/min/1.73     Narrative:      GFR Normal >60  Chronic Kidney Disease <60  Kidney Failure <15      Lactic Acid, Plasma [[id number]]  (Normal) Collected: 10/15/24 1951    Specimen:  Blood Updated: 10/15/24 2027     Lactate 0.8 mmol/L     CBC (No Diff) [[id number]]  (Abnormal) Collected: 10/15/24 1951    Specimen: Blood Updated: 10/15/24 2015     WBC 17.52 10*3/mm3      RBC 3.66 10*6/mm3      Hemoglobin 11.1 g/dL      Hematocrit 32.5 %      MCV 88.8 fL      MCH 30.3 pg      MCHC 34.2 g/dL      RDW 12.1 %      RDW-SD 39.0 fl      MPV 9.9 fL      Platelets 282 10*3/mm3     Blood Culture - Blood, Hand, Right [[id number]] Collected: 10/15/24 1951    Specimen: Blood from Hand, Right Updated: 10/15/24 2004    Blood Culture - Blood, Hand, Left [[id number]] Collected: 10/15/24 1951    Specimen: Blood from Hand, Left Updated: 10/15/24 2003          Assessment:  1.  Status post second trimester fetal loss.  2.  Endometritis  3.  Possible retained products of conception.      Plan:  The patient has been treated with IV antibiotics.  She has passed some tissue and now her cervix is closed, after having been 2 cm dilated yesterday.  She had a fever spike to 101 at 2:00 this morning, but is now afebrile.  White cell count has only decreased a small amount.  I counseled the patient regarding these findings and my recommendations.  First, she had a fever spike and very slight decrease in her white count.  I recommend an infectious disease consult to confirm that the correct antibiotics have been chosen.  Further, I recommend continued IV antibiotics today with a repeat white cell count in the morning.  If this is decreasing and the patient remains afebrile, discharge could be considered in 1 to 2 days.  Next, the patient passed some tissue yesterday and her cervix is now closed.  I recommend giving Methergine today to evacuate any remaining products that may be in the uterus.  Ultrasound will be repeated tomorrow.  If the patient is recovering appropriately and the endometrium has improved, D&C would not be needed.  If ultrasound is less reassuring, or if the patient is not recovering properly, D&C could then be  considered.    Yasmani Long MD  10/16/2024     Electronically signed by Yasmani Long MD at 10/16/24 1041          Consult Notes (last 24 hours)        Mary Grace Mccarthy MD at 10/16/24 1141          Referring Provider: Nicolás Verma MD  [address]  [address]  [place] [address]  Reason for Consultation: Endometritis    Subjective   History of present illness: Medical history was obtained with the help of a .    This is a 24-year-old female who was admitted on October 15 with fever.  [facility] and cramps.  She delivered a previable 10-week fetus.  She was doing well until the day prior to admission when she started experiencing bleeding with severe pain and fever.  She was evaluated by OB/GYN with concerns for incomplete  with sepsis.  Admission blood work revealed a white blood cell count of 17,000.  There was initial concern for retained products of conception.  The patient did pass some tissue yesterday and her cervix is now closed.    Currently the patient states her abdominal pain has completely resolved.  Her vaginal bleeding has decreased significantly.  She denies any nausea vomiting or diarrhea.  No shortness of breath or cough    History reviewed. No pertinent past medical history.    History reviewed. No pertinent surgical history.     reports that she has never smoked. She has never used smokeless tobacco. She reports that she does not currently use alcohol. She reports that she does not use drugs.    family history includes No Known Problems in her father and mother.    Allergies   Allergen Reactions    Penicillins Rash       Medication:  Antibiotics:  Ceftriaxone 2 g IV every 24 hours  Flagyl 500 mg IV every 12 hours    Please refer to the medical record for a full medication list    Review of Systems  Pertinent items are noted in HPI, all other systems reviewed and negative    Objective    Vital Signs   Temp:  [96.9 °F (36.1 °C)-101 °F (38.3 °C)] 96.9 °F (36.1 °C)  Heart Rate:  [] 87  Resp:  [18] 18  BP: ()/(59-79) 98/64    Physical Exam:   General: In no acute distress  HEENT: Normocephalic, atraumatic, no scleral icterus.   Neck: Supple, trachea is midline  Cardiovascular: Normal rate, regular rhythm, normal S1 and S2, no murmurs, rubs, or gallops   Respiratory: Lungs are clear to auscultation bilaterally,   GI: Abdomen is soft, nontender, nondistended, positive bowel sounds bilaterally,   Musculoskeletal: no edema, tenderness or deformity  Skin: No rashes   Extremities: No E/C/C  Neurological: Alert and oriented, moving all 4 extremity  Psychiatric: Normal mood and affect     Results Review:   I reviewed the patient's new clinical results.  I reviewed the patient's new imaging results and agree with the interpretation.    Lab Results   Component Value Date    WBC 16.94 (H) 10/16/2024    HGB 10.8 (L) 10/16/2024    HCT 31.4 (L) 10/16/2024    MCV 89.0 10/16/2024     10/16/2024       Lab Results   Component Value Date    GLUCOSE 102 (H) 10/16/2024    BUN 7 10/16/2024    CREATININE 0.71 10/16/2024    BCR 9.9 10/16/2024    CO2 24.7 10/16/2024    CALCIUM 8.9 10/16/2024    ALBUMIN 2.8 (L) 10/16/2024     (H) 10/16/2024    ALT 87 (H) 10/16/2024     Procal 0.21    Microbiology:  10/15 BCx P x 2    Radiology:  none    Assessment & Plan   Endometritis  Possible retained products of conception?  Status post second trimester fetal loss  Leukocytosis  Elevated LFTs    Patient has improved clinically with decreased white blood cell count and more importantly resolved abdominal pain.  Recommend continuing ceftriaxone 2 g IV every 24 hours and increasing Flagyl to 500 mg IV every 8 hours.  If the white blood cell count does not continue to decrease or the patient remains febrile would recommend ruling out retained products of conception    I discussed the patient's findings and my  recommendations with patient and nursing staff             Electronically signed by Mary Grace Mccarthy MD at 10/16/24 2340

## 2024-10-16 NOTE — CONSULTS
Referring Provider: Nicolás Verma MD  [address]  [address]  [place] [address]  Reason for Consultation: Endometritis    Subjective   History of present illness: Medical history was obtained with the help of a .    This is a 24-year-old female who was admitted on October 15 with fever.  The patient presented to [facility] on  with pelvic pain and cramps.  She delivered a previable 10-week fetus.  She was doing well until the day prior to admission when she started experiencing bleeding with severe pain and fever.  She was evaluated by OB/GYN with concerns for incomplete  with sepsis.  Admission blood work revealed a white blood cell count of 17,000.  There was initial concern for retained products of conception.  The patient did pass some tissue yesterday and her cervix is now closed.    Currently the patient states her abdominal pain has completely resolved.  Her vaginal bleeding has decreased significantly.  She denies any nausea vomiting or diarrhea.  No shortness of breath or cough    History reviewed. No pertinent past medical history.    History reviewed. No pertinent surgical history.     reports that she has never smoked. She has never used smokeless tobacco. She reports that she does not currently use alcohol. She reports that she does not use drugs.    family history includes No Known Problems in her father and mother.    Allergies   Allergen Reactions    Penicillins Rash       Medication:  Antibiotics:  Ceftriaxone 2 g IV every 24 hours  Flagyl 500 mg IV every 12 hours    Please refer to the medical record for a full medication list    Review of Systems  Pertinent items are noted in HPI, all other systems reviewed and negative    Objective   Vital Signs   Temp:  [96.9 °F (36.1 °C)-101 °F (38.3 °C)] 96.9 °F (36.1 °C)  Heart Rate:  [] 87  Resp:  [18] 18  BP: ()/(59-79) 98/64    Physical Exam:   General: In no acute  distress  HEENT: Normocephalic, atraumatic, no scleral icterus.   Neck: Supple, trachea is midline  Cardiovascular: Normal rate, regular rhythm, normal S1 and S2, no murmurs, rubs, or gallops   Respiratory: Lungs are clear to auscultation bilaterally,   GI: Abdomen is soft, nontender, nondistended, positive bowel sounds bilaterally,   Musculoskeletal: no edema, tenderness or deformity  Skin: No rashes   Extremities: No E/C/C  Neurological: Alert and oriented, moving all 4 extremity  Psychiatric: Normal mood and affect     Results Review:   I reviewed the patient's new clinical results.  I reviewed the patient's new imaging results and agree with the interpretation.    Lab Results   Component Value Date    WBC 16.94 (H) 10/16/2024    HGB 10.8 (L) 10/16/2024    HCT 31.4 (L) 10/16/2024    MCV 89.0 10/16/2024     10/16/2024       Lab Results   Component Value Date    GLUCOSE 102 (H) 10/16/2024    BUN 7 10/16/2024    CREATININE 0.71 10/16/2024    BCR 9.9 10/16/2024    CO2 24.7 10/16/2024    CALCIUM 8.9 10/16/2024    ALBUMIN 2.8 (L) 10/16/2024     (H) 10/16/2024    ALT 87 (H) 10/16/2024     Procal 0.21    Microbiology:  10/15 BCx P x 2    Radiology:  none    Assessment & Plan   Endometritis  Possible retained products of conception?  Status post second trimester fetal loss  Leukocytosis  Elevated LFTs    Patient has improved clinically with decreased white blood cell count and more importantly resolved abdominal pain.  Recommend continuing ceftriaxone 2 g IV every 24 hours and increasing Flagyl to 500 mg IV every 8 hours.  If the white blood cell count does not continue to decrease or the patient remains febrile would recommend ruling out retained products of conception    I discussed the patient's findings and my recommendations with patient and nursing staff

## 2024-10-16 NOTE — PROGRESS NOTES
DAILY PROGRESS NOTE    Patient Identification:  Name: Martha Mckeon  Age: 24 y.o.  Sex: female  :  2000  MRN: [id number]               Subjective:  Interval History: 24-year-old lady was admitted 6 days out from a spontaneous vaginal delivery of a viable 15-week fetus.  She had pain and fever.  Concern was endometritis versus endometritis with retained products of conception.      The patient was admitted and started on IV antibiotics.  She had a fever to 101 at 2:00 this morning.  She is now afebrile.  She reports passing some tissue yesterday afternoon with minimal bleeding since then.  Her pain has improved.      Objective:    Scheduled Meds:cefTRIAXone, 2,000 mg, Intravenous, Q24H  methylergonovine, 200 mcg, Oral, Q6H  metroNIDAZOLE, 500 mg, Intravenous, Q12H  sodium chloride, 10 mL, Intravenous, Q12H      Continuous Infusions:   PRN Meds:  bisacodyl    docusate sodium    HYDROcodone-acetaminophen    HYDROmorphone    ibuprofen    influenza vaccine    lidocaine    ondansetron ODT **OR** ondansetron    Potassium Replacement - Follow Nurse / BPA Driven Protocol    sodium chloride    Vital signs in last 24 hours:  Temp:  [97.2 °F (36.2 °C)-101 °F (38.3 °C)] 98.9 °F (37.2 °C)  Heart Rate:  [] 97  Resp:  [18] 18  BP: ()/(59-79) 96/59    Intake/Output:    Intake/Output Summary (Last 24 hours) at 10/16/2024 0938  Last data filed at 10/16/2024 0933  Gross per 24 hour   Intake 1884.33 ml   Output 1020 ml   Net 864.33 ml       Exam:  General Appearance:    Alert, cooperative, no distress, appears stated age   Lungs:     Clear to auscultation bilaterally, respirations unlabored    Heart:    Regular rate and rhythm, S1 and S2 normal, no murmur, rub   or gallop   Abdomen:   Soft, nondistended.  There is no epigastric tenderness.  No right or left lower quadrant tenderness.    Pelvic examination reveals normal female external genitalia.  There is minimal old blood in the vaginal vault.  The cervix  is closed.  The uterus is slightly enlarged and mildly tender.   Extremities: Equal in size   Skin:   Skin color, texture, turgor normal, no rashes or lesions        Data Review:    Lab Results (last 24 hours)       Procedure Component Value Units Date/Time    Comprehensive Metabolic Panel [[id number]]  (Abnormal) Collected: 10/16/24 0333    Specimen: Blood Updated: 10/16/24 0423     Glucose 102 mg/dL      BUN 7 mg/dL      Creatinine 0.71 mg/dL      Sodium 137 mmol/L      Potassium 3.9 mmol/L      Chloride 105 mmol/L      CO2 24.7 mmol/L      Calcium 8.9 mg/dL      Total Protein 6.8 g/dL      Albumin 2.8 g/dL      ALT (SGPT) 87 U/L      AST (SGOT) 101 U/L      Alkaline Phosphatase 123 U/L      Total Bilirubin 0.5 mg/dL      Globulin 4.0 gm/dL      A/G Ratio 0.7 g/dL      BUN/Creatinine Ratio 9.9     Anion Gap 7.3 mmol/L      eGFR 121.9 mL/min/1.73     Narrative:      GFR Normal >60  Chronic Kidney Disease <60  Kidney Failure <15      Lactic Acid, Plasma [[id number]]  (Normal) Collected: 10/16/24 0333    Specimen: Blood Updated: 10/16/24 0418     Lactate 0.6 mmol/L     CBC & Differential [[id number]]  (Abnormal) Collected: 10/16/24 0333    Specimen: Blood Updated: 10/16/24 0403    Narrative:      The following orders were created for panel order CBC & Differential.  Procedure                               Abnormality         Status                     ---------                               -----------         ------                     CBC Auto Differential[[id number]]        Abnormal            Final result                 Please view results for these tests on the individual orders.    CBC Auto Differential [[id number]]  (Abnormal) Collected: 10/16/24 0333    Specimen: Blood Updated: 10/16/24 0403     WBC 16.94 10*3/mm3      RBC 3.53 10*6/mm3      Hemoglobin 10.8 g/dL      Hematocrit 31.4 %      MCV 89.0 fL      MCH 30.6 pg      MCHC 34.4 g/dL      RDW 12.2 %      RDW-SD 39.0 fl      MPV 9.8 fL      Platelets 270  10*3/mm3      Neutrophil % 81.5 %      Lymphocyte % 9.4 %      Monocyte % 7.1 %      Eosinophil % 0.4 %      Basophil % 0.2 %      Immature Grans % 1.4 %      Neutrophils, Absolute 13.80 10*3/mm3      Lymphocytes, Absolute 1.60 10*3/mm3      Monocytes, Absolute 1.21 10*3/mm3      Eosinophils, Absolute 0.06 10*3/mm3      Basophils, Absolute 0.04 10*3/mm3      Immature Grans, Absolute 0.23 10*3/mm3      nRBC 0.0 /100 WBC     hCG, Quantitative, Pregnancy [[id number]] Collected: 10/15/24 1951    Specimen: Blood Updated: 10/15/24 2031     HCG Quantitative 31.30 mIU/mL     Narrative:      HCG Ranges by Gestational Age    Females - non-pregnant premenopausal   </= 1mIU/mL HCG  Females - postmenopausal               </= 7mIU/mL HCG    3 Weeks       5.4   -      72 mIU/mL  4 Weeks      10.2   -     708 mIU/mL  5 Weeks       217   -   8,245 mIU/mL  6 Weeks       152   -  32,177 mIU/mL  7 Weeks     4,059   - 153,767 mIU/mL  8 Weeks    31,366   - 149,094 mIU/mL  9 Weeks    59,109   - 135,901 mIU/mL  10 Weeks   44,186   - 170,409 mIU/mL  12 Weeks   27,107   - 201,615 mIU/mL  14 Weeks   24,302   -  93,646 mIU/mL  15 Weeks   12,540   -  69,747 mIU/mL  16 Weeks    8,904   -  55,332 mIU/mL  17 Weeks    8,240   -  51,793 mIU/mL  18 Weeks    9,649   -  55,271 mIU/mL    Results may be falsely decreased if patient taking Biotin.      Comprehensive Metabolic Panel [[id number]]  (Abnormal) Collected: 10/15/24 1951    Specimen: Blood Updated: 10/15/24 2029     Glucose 116 mg/dL      BUN 10 mg/dL      Creatinine 0.83 mg/dL      Sodium 135 mmol/L      Potassium 2.9 mmol/L      Chloride 101 mmol/L      CO2 22.3 mmol/L      Calcium 9.2 mg/dL      Total Protein 7.3 g/dL      Albumin 3.2 g/dL      ALT (SGPT) 81 U/L      AST (SGOT) 106 U/L      Alkaline Phosphatase 125 U/L      Total Bilirubin 0.7 mg/dL      Globulin 4.1 gm/dL      A/G Ratio 0.8 g/dL      BUN/Creatinine Ratio 12.0     Anion Gap 11.7 mmol/L      eGFR 101.1 mL/min/1.73     Narrative:       GFR Normal >60  Chronic Kidney Disease <60  Kidney Failure <15      Lactic Acid, Plasma [[id number]]  (Normal) Collected: 10/15/24 1951    Specimen: Blood Updated: 10/15/24 2027     Lactate 0.8 mmol/L     CBC (No Diff) [[id number]]  (Abnormal) Collected: 10/15/24 1951    Specimen: Blood Updated: 10/15/24 2015     WBC 17.52 10*3/mm3      RBC 3.66 10*6/mm3      Hemoglobin 11.1 g/dL      Hematocrit 32.5 %      MCV 88.8 fL      MCH 30.3 pg      MCHC 34.2 g/dL      RDW 12.1 %      RDW-SD 39.0 fl      MPV 9.9 fL      Platelets 282 10*3/mm3     Blood Culture - Blood, Hand, Right [[id number]] Collected: 10/15/24 1951    Specimen: Blood from Hand, Right Updated: 10/15/24 2004    Blood Culture - Blood, Hand, Left [[id number]] Collected: 10/15/24 1951    Specimen: Blood from Hand, Left Updated: 10/15/24 2003          Assessment:  1.  Status post second trimester fetal loss.  2.  Endometritis  3.  Possible retained products of conception.      Plan:  The patient has been treated with IV antibiotics.  She has passed some tissue and now her cervix is closed, after having been 2 cm dilated yesterday.  She had a fever spike to 101 at 2:00 this morning, but is now afebrile.  White cell count has only decreased a small amount.  I counseled the patient regarding these findings and my recommendations.  First, she had a fever spike and very slight decrease in her white count.  I recommend an infectious disease consult to confirm that the correct antibiotics have been chosen.  Further, I recommend continued IV antibiotics today with a repeat white cell count in the morning.  If this is decreasing and the patient remains afebrile, discharge could be considered in 1 to 2 days.  Next, the patient passed some tissue yesterday and her cervix is now closed.  I recommend giving Methergine today to evacuate any remaining products that may be in the uterus.  Ultrasound will be repeated tomorrow.  If the patient is recovering appropriately and  the endometrium has improved, D&C would not be needed.  If ultrasound is less reassuring, or if the patient is not recovering properly, D&C could then be considered.    Yasmani Long MD  10/16/2024

## 2024-10-17 ENCOUNTER — ANESTHESIA (OUTPATIENT)
Dept: PERIOP | Facility: HOSPITAL | Age: 24
End: 2024-10-17
Payer: MEDICAID

## 2024-10-17 ENCOUNTER — APPOINTMENT (OUTPATIENT)
Dept: ULTRASOUND IMAGING | Facility: HOSPITAL | Age: 24
DRG: 770 | End: 2024-10-17
Payer: MEDICAID

## 2024-10-17 ENCOUNTER — ANESTHESIA EVENT (OUTPATIENT)
Dept: PERIOP | Facility: HOSPITAL | Age: 24
End: 2024-10-17
Payer: MEDICAID

## 2024-10-17 LAB
ALBUMIN SERPL-MCNC: 2.5 G/DL (ref 3.5–5.2)
ALBUMIN/GLOB SERPL: 0.7 G/DL
ALP SERPL-CCNC: 120 U/L (ref 39–117)
ALT SERPL W P-5'-P-CCNC: 84 U/L (ref 1–33)
ANION GAP SERPL CALCULATED.3IONS-SCNC: 5.8 MMOL/L (ref 5–15)
AST SERPL-CCNC: 58 U/L (ref 1–32)
BASOPHILS # BLD AUTO: 0.05 10*3/MM3 (ref 0–0.2)
BASOPHILS NFR BLD AUTO: 0.4 % (ref 0–1.5)
BILIRUB SERPL-MCNC: 0.3 MG/DL (ref 0–1.2)
BUN SERPL-MCNC: 7 MG/DL (ref 6–20)
BUN/CREAT SERPL: 10.1 (ref 7–25)
CALCIUM SPEC-SCNC: 8.6 MG/DL (ref 8.6–10.5)
CHLORIDE SERPL-SCNC: 108 MMOL/L (ref 98–107)
CO2 SERPL-SCNC: 25.2 MMOL/L (ref 22–29)
CREAT SERPL-MCNC: 0.69 MG/DL (ref 0.57–1)
DEPRECATED RDW RBC AUTO: 40 FL (ref 37–54)
EGFRCR SERPLBLD CKD-EPI 2021: 124.5 ML/MIN/1.73
EOSINOPHIL # BLD AUTO: 0.15 10*3/MM3 (ref 0–0.4)
EOSINOPHIL NFR BLD AUTO: 1.2 % (ref 0.3–6.2)
ERYTHROCYTE [DISTWIDTH] IN BLOOD BY AUTOMATED COUNT: 12.2 % (ref 12.3–15.4)
GLOBULIN UR ELPH-MCNC: 3.8 GM/DL
GLUCOSE SERPL-MCNC: 100 MG/DL (ref 65–99)
HCT VFR BLD AUTO: 30.8 % (ref 34–46.6)
HGB BLD-MCNC: 10.9 G/DL (ref 12–15.9)
IMM GRANULOCYTES # BLD AUTO: 0.24 10*3/MM3 (ref 0–0.05)
IMM GRANULOCYTES NFR BLD AUTO: 1.9 % (ref 0–0.5)
LYMPHOCYTES # BLD AUTO: 1.8 10*3/MM3 (ref 0.7–3.1)
LYMPHOCYTES NFR BLD AUTO: 14 % (ref 19.6–45.3)
MCH RBC QN AUTO: 32.1 PG (ref 26.6–33)
MCHC RBC AUTO-ENTMCNC: 35.4 G/DL (ref 31.5–35.7)
MCV RBC AUTO: 90.6 FL (ref 79–97)
MONOCYTES # BLD AUTO: 0.81 10*3/MM3 (ref 0.1–0.9)
MONOCYTES NFR BLD AUTO: 6.3 % (ref 5–12)
NEUTROPHILS NFR BLD AUTO: 76.2 % (ref 42.7–76)
NEUTROPHILS NFR BLD AUTO: 9.81 10*3/MM3 (ref 1.7–7)
NRBC BLD AUTO-RTO: 0 /100 WBC (ref 0–0.2)
PLATELET # BLD AUTO: 283 10*3/MM3 (ref 140–450)
PMV BLD AUTO: 9.8 FL (ref 6–12)
POTASSIUM SERPL-SCNC: 3.8 MMOL/L (ref 3.5–5.2)
PROT SERPL-MCNC: 6.3 G/DL (ref 6–8.5)
RBC # BLD AUTO: 3.4 10*6/MM3 (ref 3.77–5.28)
SODIUM SERPL-SCNC: 139 MMOL/L (ref 136–145)
WBC NRBC COR # BLD AUTO: 12.86 10*3/MM3 (ref 3.4–10.8)

## 2024-10-17 PROCEDURE — 25810000003 SODIUM CHLORIDE 0.9 % SOLUTION 250 ML FLEX CONT: Performed by: OBSTETRICS & GYNECOLOGY

## 2024-10-17 PROCEDURE — 80053 COMPREHEN METABOLIC PANEL: CPT | Performed by: INTERNAL MEDICINE

## 2024-10-17 PROCEDURE — 25010000002 METRONIDAZOLE 500 MG/100ML SOLUTION: Performed by: INTERNAL MEDICINE

## 2024-10-17 PROCEDURE — 25010000002 PROPOFOL 200 MG/20ML EMULSION: Performed by: NURSE ANESTHETIST, CERTIFIED REGISTERED

## 2024-10-17 PROCEDURE — 85025 COMPLETE CBC W/AUTO DIFF WBC: CPT | Performed by: OBSTETRICS & GYNECOLOGY

## 2024-10-17 PROCEDURE — 88342 IMHCHEM/IMCYTCHM 1ST ANTB: CPT | Performed by: OBSTETRICS & GYNECOLOGY

## 2024-10-17 PROCEDURE — 25010000002 ONDANSETRON PER 1 MG: Performed by: OBSTETRICS & GYNECOLOGY

## 2024-10-17 PROCEDURE — 10D17ZZ EXTRACTION OF PRODUCTS OF CONCEPTION, RETAINED, VIA NATURAL OR ARTIFICIAL OPENING: ICD-10-PCS | Performed by: OBSTETRICS & GYNECOLOGY

## 2024-10-17 PROCEDURE — 76830 TRANSVAGINAL US NON-OB: CPT

## 2024-10-17 PROCEDURE — 25010000002 CEFTRIAXONE PER 250 MG: Performed by: INTERNAL MEDICINE

## 2024-10-17 PROCEDURE — 25010000002 DEXAMETHASONE SODIUM PHOSPHATE 20 MG/5ML SOLUTION: Performed by: NURSE ANESTHETIST, CERTIFIED REGISTERED

## 2024-10-17 PROCEDURE — 25810000003 LACTATED RINGERS PER 1000 ML: Performed by: ANESTHESIOLOGY

## 2024-10-17 PROCEDURE — 99232 SBSQ HOSP IP/OBS MODERATE 35: CPT | Performed by: INTERNAL MEDICINE

## 2024-10-17 PROCEDURE — 88305 TISSUE EXAM BY PATHOLOGIST: CPT | Performed by: OBSTETRICS & GYNECOLOGY

## 2024-10-17 PROCEDURE — 59812 TREATMENT OF MISCARRIAGE: CPT | Performed by: OBSTETRICS & GYNECOLOGY

## 2024-10-17 PROCEDURE — 25010000002 KETOROLAC TROMETHAMINE PER 15 MG: Performed by: NURSE ANESTHETIST, CERTIFIED REGISTERED

## 2024-10-17 RX ORDER — DROPERIDOL 2.5 MG/ML
0.62 INJECTION, SOLUTION INTRAMUSCULAR; INTRAVENOUS
Status: DISCONTINUED | OUTPATIENT
Start: 2024-10-17 | End: 2024-10-17 | Stop reason: HOSPADM

## 2024-10-17 RX ORDER — PROPOFOL 10 MG/ML
INJECTION, EMULSION INTRAVENOUS AS NEEDED
Status: DISCONTINUED | OUTPATIENT
Start: 2024-10-17 | End: 2024-10-17 | Stop reason: SURG

## 2024-10-17 RX ORDER — SCOLOPAMINE TRANSDERMAL SYSTEM 1 MG/1
1 PATCH, EXTENDED RELEASE TRANSDERMAL ONCE
Status: DISCONTINUED | OUTPATIENT
Start: 2024-10-17 | End: 2024-10-18

## 2024-10-17 RX ORDER — HYDROMORPHONE HYDROCHLORIDE 1 MG/ML
0.5 INJECTION, SOLUTION INTRAMUSCULAR; INTRAVENOUS; SUBCUTANEOUS
Status: DISCONTINUED | OUTPATIENT
Start: 2024-10-17 | End: 2024-10-17 | Stop reason: HOSPADM

## 2024-10-17 RX ORDER — FLUMAZENIL 0.1 MG/ML
0.2 INJECTION INTRAVENOUS AS NEEDED
Status: DISCONTINUED | OUTPATIENT
Start: 2024-10-17 | End: 2024-10-17 | Stop reason: HOSPADM

## 2024-10-17 RX ORDER — SODIUM CHLORIDE 0.9 % (FLUSH) 0.9 %
3 SYRINGE (ML) INJECTION EVERY 12 HOURS SCHEDULED
Status: DISCONTINUED | OUTPATIENT
Start: 2024-10-17 | End: 2024-10-17 | Stop reason: HOSPADM

## 2024-10-17 RX ORDER — LABETALOL HYDROCHLORIDE 5 MG/ML
5 INJECTION, SOLUTION INTRAVENOUS
Status: DISCONTINUED | OUTPATIENT
Start: 2024-10-17 | End: 2024-10-17 | Stop reason: HOSPADM

## 2024-10-17 RX ORDER — LIDOCAINE HYDROCHLORIDE 10 MG/ML
0.5 INJECTION, SOLUTION INFILTRATION; PERINEURAL ONCE AS NEEDED
Status: DISCONTINUED | OUTPATIENT
Start: 2024-10-17 | End: 2024-10-17 | Stop reason: HOSPADM

## 2024-10-17 RX ORDER — PROMETHAZINE HYDROCHLORIDE 25 MG/1
25 SUPPOSITORY RECTAL ONCE AS NEEDED
Status: DISCONTINUED | OUTPATIENT
Start: 2024-10-17 | End: 2024-10-17 | Stop reason: HOSPADM

## 2024-10-17 RX ORDER — EPHEDRINE SULFATE 50 MG/ML
5 INJECTION, SOLUTION INTRAVENOUS ONCE AS NEEDED
Status: DISCONTINUED | OUTPATIENT
Start: 2024-10-17 | End: 2024-10-17 | Stop reason: HOSPADM

## 2024-10-17 RX ORDER — HYDROCODONE BITARTRATE AND ACETAMINOPHEN 5; 325 MG/1; MG/1
1 TABLET ORAL ONCE AS NEEDED
Status: DISCONTINUED | OUTPATIENT
Start: 2024-10-17 | End: 2024-10-17 | Stop reason: HOSPADM

## 2024-10-17 RX ORDER — SODIUM CHLORIDE, SODIUM LACTATE, POTASSIUM CHLORIDE, CALCIUM CHLORIDE 600; 310; 30; 20 MG/100ML; MG/100ML; MG/100ML; MG/100ML
9 INJECTION, SOLUTION INTRAVENOUS CONTINUOUS
Status: DISCONTINUED | OUTPATIENT
Start: 2024-10-17 | End: 2024-10-18 | Stop reason: HOSPADM

## 2024-10-17 RX ORDER — DOXYCYCLINE 100 MG/10ML
200 INJECTION, POWDER, LYOPHILIZED, FOR SOLUTION INTRAVENOUS ONCE
Status: CANCELLED | OUTPATIENT
Start: 2024-10-17 | End: 2024-10-17

## 2024-10-17 RX ORDER — FAMOTIDINE 10 MG/ML
20 INJECTION, SOLUTION INTRAVENOUS ONCE
Status: COMPLETED | OUTPATIENT
Start: 2024-10-17 | End: 2024-10-17

## 2024-10-17 RX ORDER — FENTANYL CITRATE 50 UG/ML
50 INJECTION, SOLUTION INTRAMUSCULAR; INTRAVENOUS ONCE AS NEEDED
Status: DISCONTINUED | OUTPATIENT
Start: 2024-10-17 | End: 2024-10-17 | Stop reason: HOSPADM

## 2024-10-17 RX ORDER — HYDRALAZINE HYDROCHLORIDE 20 MG/ML
5 INJECTION INTRAMUSCULAR; INTRAVENOUS
Status: DISCONTINUED | OUTPATIENT
Start: 2024-10-17 | End: 2024-10-17 | Stop reason: HOSPADM

## 2024-10-17 RX ORDER — NALOXONE HCL 0.4 MG/ML
0.2 VIAL (ML) INJECTION AS NEEDED
Status: DISCONTINUED | OUTPATIENT
Start: 2024-10-17 | End: 2024-10-17 | Stop reason: HOSPADM

## 2024-10-17 RX ORDER — SUCCINYLCHOLINE/SOD CL,ISO/PF 200MG/10ML
SYRINGE (ML) INTRAVENOUS AS NEEDED
Status: DISCONTINUED | OUTPATIENT
Start: 2024-10-17 | End: 2024-10-17 | Stop reason: SURG

## 2024-10-17 RX ORDER — SODIUM CHLORIDE 0.9 % (FLUSH) 0.9 %
3-10 SYRINGE (ML) INJECTION AS NEEDED
Status: DISCONTINUED | OUTPATIENT
Start: 2024-10-17 | End: 2024-10-17 | Stop reason: HOSPADM

## 2024-10-17 RX ORDER — DOXYCYCLINE 100 MG/10ML
200 INJECTION, POWDER, LYOPHILIZED, FOR SOLUTION INTRAVENOUS ONCE
Status: DISCONTINUED | OUTPATIENT
Start: 2024-10-17 | End: 2024-10-17

## 2024-10-17 RX ORDER — DIPHENHYDRAMINE HYDROCHLORIDE 50 MG/ML
12.5 INJECTION INTRAMUSCULAR; INTRAVENOUS
Status: DISCONTINUED | OUTPATIENT
Start: 2024-10-17 | End: 2024-10-17 | Stop reason: HOSPADM

## 2024-10-17 RX ORDER — PROMETHAZINE HYDROCHLORIDE 25 MG/1
25 TABLET ORAL ONCE AS NEEDED
Status: DISCONTINUED | OUTPATIENT
Start: 2024-10-17 | End: 2024-10-17 | Stop reason: HOSPADM

## 2024-10-17 RX ORDER — FENTANYL CITRATE 50 UG/ML
50 INJECTION, SOLUTION INTRAMUSCULAR; INTRAVENOUS
Status: DISCONTINUED | OUTPATIENT
Start: 2024-10-17 | End: 2024-10-17 | Stop reason: HOSPADM

## 2024-10-17 RX ORDER — KETOROLAC TROMETHAMINE 30 MG/ML
INJECTION, SOLUTION INTRAMUSCULAR; INTRAVENOUS AS NEEDED
Status: DISCONTINUED | OUTPATIENT
Start: 2024-10-17 | End: 2024-10-17 | Stop reason: SURG

## 2024-10-17 RX ORDER — IPRATROPIUM BROMIDE AND ALBUTEROL SULFATE 2.5; .5 MG/3ML; MG/3ML
3 SOLUTION RESPIRATORY (INHALATION) ONCE AS NEEDED
Status: DISCONTINUED | OUTPATIENT
Start: 2024-10-17 | End: 2024-10-17 | Stop reason: HOSPADM

## 2024-10-17 RX ORDER — DEXAMETHASONE SODIUM PHOSPHATE 4 MG/ML
INJECTION, SOLUTION INTRA-ARTICULAR; INTRALESIONAL; INTRAMUSCULAR; INTRAVENOUS; SOFT TISSUE AS NEEDED
Status: DISCONTINUED | OUTPATIENT
Start: 2024-10-17 | End: 2024-10-17 | Stop reason: SURG

## 2024-10-17 RX ORDER — OXYCODONE AND ACETAMINOPHEN 7.5; 325 MG/1; MG/1
1 TABLET ORAL EVERY 4 HOURS PRN
Status: DISCONTINUED | OUTPATIENT
Start: 2024-10-17 | End: 2024-10-17 | Stop reason: HOSPADM

## 2024-10-17 RX ORDER — ACETAMINOPHEN 500 MG
1000 TABLET ORAL ONCE
Status: COMPLETED | OUTPATIENT
Start: 2024-10-17 | End: 2024-10-17

## 2024-10-17 RX ORDER — MIDAZOLAM HYDROCHLORIDE 1 MG/ML
1 INJECTION INTRAMUSCULAR; INTRAVENOUS
Status: DISCONTINUED | OUTPATIENT
Start: 2024-10-17 | End: 2024-10-17 | Stop reason: HOSPADM

## 2024-10-17 RX ORDER — ONDANSETRON 2 MG/ML
4 INJECTION INTRAMUSCULAR; INTRAVENOUS ONCE AS NEEDED
Status: DISCONTINUED | OUTPATIENT
Start: 2024-10-17 | End: 2024-10-17 | Stop reason: HOSPADM

## 2024-10-17 RX ADMIN — SODIUM CHLORIDE, POTASSIUM CHLORIDE, SODIUM LACTATE AND CALCIUM CHLORIDE: 600; 310; 30; 20 INJECTION, SOLUTION INTRAVENOUS at 21:32

## 2024-10-17 RX ADMIN — Medication 100 MG: at 21:35

## 2024-10-17 RX ADMIN — METRONIDAZOLE 500 MG: 500 INJECTION, SOLUTION INTRAVENOUS at 03:53

## 2024-10-17 RX ADMIN — PROPOFOL 200 MG: 10 INJECTION, EMULSION INTRAVENOUS at 21:35

## 2024-10-17 RX ADMIN — CEFTRIAXONE 2000 MG: 2 INJECTION, POWDER, FOR SOLUTION INTRAMUSCULAR; INTRAVENOUS at 23:58

## 2024-10-17 RX ADMIN — DEXAMETHASONE SODIUM PHOSPHATE 6 MG: 4 INJECTION, SOLUTION INTRAMUSCULAR; INTRAVENOUS at 21:43

## 2024-10-17 RX ADMIN — DOXYCYCLINE 200 MG: 100 INJECTION, POWDER, LYOPHILIZED, FOR SOLUTION INTRAVENOUS at 21:44

## 2024-10-17 RX ADMIN — FAMOTIDINE 20 MG: 10 INJECTION INTRAVENOUS at 21:23

## 2024-10-17 RX ADMIN — Medication 10 ML: at 09:50

## 2024-10-17 RX ADMIN — ONDANSETRON 4 MG: 2 INJECTION INTRAMUSCULAR; INTRAVENOUS at 21:52

## 2024-10-17 RX ADMIN — ACETAMINOPHEN 1000 MG: 500 TABLET ORAL at 21:23

## 2024-10-17 RX ADMIN — KETOROLAC TROMETHAMINE 30 MG: 30 INJECTION, SOLUTION INTRAMUSCULAR at 22:05

## 2024-10-17 RX ADMIN — METRONIDAZOLE 500 MG: 500 INJECTION, SOLUTION INTRAVENOUS at 12:27

## 2024-10-17 RX ADMIN — SCOPALAMINE 1 PATCH: 1 PATCH, EXTENDED RELEASE TRANSDERMAL at 21:23

## 2024-10-17 RX ADMIN — METHYLERGONOVINE MALEATE 200 MCG: 0.2 TABLET ORAL at 00:02

## 2024-10-17 NOTE — PLAN OF CARE
AAOX4. RA. Up ad rosy. Vaginal US completed. Patient signed consent for D&C procedure. NPO since 1200.   Denies pain and vaginal bleeding.  utilized for all communication.     Goal Outcome Evaluation:  Plan of Care Reviewed With: patient        Progress: improving          Problem: Adult Inpatient Plan of Care  Goal: Plan of Care Review  Outcome: Progressing  Flowsheets (Taken 10/17/2024 1940)  Progress: improving  Plan of Care Reviewed With: patient  Goal: Patient-Specific Goal (Individualized)  Outcome: Progressing  Goal: Absence of Hospital-Acquired Illness or Injury  Outcome: Progressing  Intervention: Identify and Manage Fall Risk  Recent Flowsheet Documentation  Taken 10/17/2024 1800 by Michelle Marie RN  Safety Promotion/Fall Prevention:   safety round/check completed   room organization consistent  Taken 10/17/2024 1500 by Michelle Marie RN  Safety Promotion/Fall Prevention:   safety round/check completed   room organization consistent   nonskid shoes/slippers when out of bed   lighting adjusted   clutter free environment maintained   assistive device/personal items within reach   activity supervised  Taken 10/17/2024 1435 by Michelle Marie RN  Safety Promotion/Fall Prevention: safety round/check completed  Taken 10/17/2024 1000 by Michelle Marie RN  Safety Promotion/Fall Prevention:   safety round/check completed   room organization consistent   nonskid shoes/slippers when out of bed   lighting adjusted   assistive device/personal items within reach   clutter free environment maintained  Taken 10/17/2024 0950 by Michelle Marie RN  Safety Promotion/Fall Prevention:   safety round/check completed   room organization consistent   nonskid shoes/slippers when out of bed   lighting adjusted   clutter free environment maintained   assistive device/personal items within reach   activity supervised  Taken 10/17/2024 0800 by Michelle Marie, RN  Safety Promotion/Fall Prevention: patient off  unit  Intervention: Prevent Skin Injury  Recent Flowsheet Documentation  Taken 10/17/2024 1800 by Michelle Marie RN  Body Position: position changed independently  Taken 10/17/2024 1645 by Michelle Marie RN  Body Position: position changed independently  Taken 10/17/2024 1500 by Michelle Marie RN  Body Position: position changed independently  Skin Protection: transparent dressing maintained  Taken 10/17/2024 1000 by Michelle Marie RN  Body Position: position changed independently  Taken 10/17/2024 0950 by Michelle Marie RN  Body Position: position changed independently  Skin Protection: transparent dressing maintained  Intervention: Prevent and Manage VTE (Venous Thromboembolism) Risk  Recent Flowsheet Documentation  Taken 10/17/2024 0950 by Michelle Marie RN  VTE Prevention/Management:   bilateral   SCDs (sequential compression devices) off   patient refused intervention  Intervention: Prevent Infection  Recent Flowsheet Documentation  Taken 10/17/2024 1800 by Michelle Marie RN  Infection Prevention: single patient room provided  Taken 10/17/2024 1500 by Michelle Marie RN  Infection Prevention:   rest/sleep promoted   hand hygiene promoted   single patient room provided  Taken 10/17/2024 1000 by Michelle Marie RN  Infection Prevention: rest/sleep promoted  Taken 10/17/2024 0950 by Michelle Marie RN  Infection Prevention:   rest/sleep promoted   single patient room provided   personal protective equipment utilized   hand hygiene promoted  Goal: Optimal Comfort and Wellbeing  Outcome: Progressing  Intervention: Provide Person-Centered Care  Recent Flowsheet Documentation  Taken 10/17/2024 1500 by Michelle Marie RN  Trust Relationship/Rapport:   choices provided   care explained   emotional support provided   empathic listening provided   questions answered   questions encouraged   reassurance provided   thoughts/feelings acknowledged  Taken 10/17/2024 0950 by Michelle Marie  RN  Trust Relationship/Rapport:   care explained   choices provided   emotional support provided   empathic listening provided   questions answered   questions encouraged   reassurance provided   thoughts/feelings acknowledged  Goal: Readiness for Transition of Care  Outcome: Progressing     Problem:  Loss  Goal: Optimal Adjustment to Loss  Outcome: Progressing  Intervention: Support the Grieving Process  Recent Flowsheet Documentation  Taken 10/17/2024 1500 by Michelle Marie RN  Supportive Measures: verbalization of feelings encouraged  Taken 10/17/2024 0950 by Michelle Marie RN  Supportive Measures:   active listening utilized   decision-making supported     Problem: Infection  Goal: Absence of Infection Signs and Symptoms  Outcome: Progressing     Problem: Sepsis/Septic Shock  Goal: Optimal Coping  Outcome: Progressing  Intervention: Support Patient and Family Response  Recent Flowsheet Documentation  Taken 10/17/2024 1500 by Michelle Marie RN  Supportive Measures: verbalization of feelings encouraged  Taken 10/17/2024 0950 by Michelle Marie RN  Supportive Measures:   active listening utilized   decision-making supported  Goal: Absence of Bleeding  Outcome: Progressing  Goal: Blood Glucose Level Within Target Range  Outcome: Progressing  Goal: Absence of Infection Signs and Symptoms  Outcome: Progressing  Intervention: Initiate Sepsis Management  Recent Flowsheet Documentation  Taken 10/17/2024 1800 by Michelle Marie RN  Infection Prevention: single patient room provided  Taken 10/17/2024 1500 by Michelle Marie RN  Infection Prevention:   rest/sleep promoted   hand hygiene promoted   single patient room provided  Taken 10/17/2024 1000 by Michelle Marie RN  Infection Prevention: rest/sleep promoted  Taken 10/17/2024 0950 by Michelle Marie RN  Infection Prevention:   rest/sleep promoted   single patient room provided   personal protective equipment utilized   hand hygiene  promoted  Intervention: Promote Recovery  Recent Flowsheet Documentation  Taken 10/17/2024 1800 by Michelle Marie, RN  Activity Management: up ad rosy  Taken 10/17/2024 1645 by Michelle Marie, RN  Activity Management: up ad rosy  Taken 10/17/2024 1500 by Michelle Marie, RN  Activity Management: up ad rosy  Sleep/Rest Enhancement:   awakenings minimized   noise level reduced   room darkened  Taken 10/17/2024 1435 by Michelle Marie, RN  Activity Management: up ad rosy  Taken 10/17/2024 1000 by Michelle Marie, RN  Activity Management: up ad rosy  Taken 10/17/2024 0950 by Michelle Marie, RN  Activity Management: up ad rosy  Sleep/Rest Enhancement: noise level reduced  Goal: Optimal Nutrition Delivery  Outcome: Progressing

## 2024-10-17 NOTE — PROGRESS NOTES
LOS: 2 days     Chief Complaint: Endometritis    Interval History: Afebrile, no new complaints.  Tolerating antibiotics without abdominal pain vomiting diarrhea or rash    Vital Signs  Temp:  [96.7 °F (35.9 °C)-98.5 °F (36.9 °C)] 98.5 °F (36.9 °C)  Heart Rate:  [] 101  Resp:  [16-18] 16  BP: ()/(58-71) 99/66    Physical Exam:  General: In no acute distress  Cardiovascular: Tachycardic no lower extremity edema  Respiratory: Breathing comfortably on room air  GI: Soft, NT/ND, + bowel sounds bilaterally  Skin: No rashes     Antibiotics:  Ceftriaxone 2 g IV every 24 hours  Flagyl 500 mg IV every 8hours     Results Review:    Lab Results   Component Value Date    WBC 16.94 (H) 10/16/2024    HGB 10.8 (L) 10/16/2024    HCT 31.4 (L) 10/16/2024    MCV 89.0 10/16/2024     10/16/2024     Lab Results   Component Value Date    GLUCOSE 102 (H) 10/16/2024    BUN 7 10/16/2024    CREATININE 0.71 10/16/2024    BCR 9.9 10/16/2024    CO2 24.7 10/16/2024    CALCIUM 8.9 10/16/2024    ALBUMIN 2.8 (L) 10/16/2024     (H) 10/16/2024    ALT 87 (H) 10/16/2024       Microbiology:  10/15 BCx P x 2     Assessment & Plan   Endometritis  Possible retained products of conception?  Status post second trimester fetal loss  Leukocytosis  Elevated LFTs    Fevers have resolved.  Awaiting morning CBC and LFTs.  Continue ceftriaxone 2 g IV every 24 hours and Flagyl 500 mg IV every 8 hours for now.  Anticipate discharge on oral antibiotics    Addendum: White blood cell count and LFTs decreasing.  As long as the patient continues to improve clinically with decreasing labs patient is okay for discharge on Keflex 500 mg p.o. every 6 hours and Flagyl p.o. 3 times daily x 7 days.

## 2024-10-17 NOTE — PROGRESS NOTES
GYNECOLOGY PROGRESS NOTE     LOS: 2 days   Patient Care Team:  Provider, No Known as PCP - General    Chief Complaint:  Incomplete  [O03.4]      Subjective     Interval History:   Martha Mckeon is a 24 y.o.  who presented with bleeding and pain after a 15w pregnancy loss.  She was admitted, started on antibiotics secondary to fever.  She also had transaminitis. Her fever improved and bleeding improved with antibiotics, but ultrasound continued to show an area concerning for retained products.    This morning, she reports no bleeding, pain is controlled    Patient was offered interpretor, but declined at this visit. She was counseled that she can notify us at any time if she prefers an interpretor and expressed agreement and understanding      Review of Systems:     Review of Systems - Negative except per HPI for 10 point review of systems including General, Psychological, Ophthalmic, ENT, Endocrine, Respiratory, Cardiovascular, Gastrointestinal, Genito-Urinary, Musculoskeletal, Neurological, Dermatological      Objective     Vital Signs  Temp:  [96.7 °F (35.9 °C)-98.5 °F (36.9 °C)] 98.5 °F (36.9 °C)  Heart Rate:  [] 101  Resp:  [16-18] 16  BP: ()/(58-71) 99/66    Physical Exam:   General Appearance: alert, appears stated age, and cooperative  Head: normocephalic, without obvious abnormality and atraumatic  Abdomen: normal bowel sounds, no masses, no hepatomegaly, no splenomegaly, soft non-tender, no guarding, and no rebound tenderness  Extremities: moves extremities well, no edema, no cyanosis, and no redness  Skin: no bleeding, bruising or rash  Neurologic: Mental Status orientated to person, place, time and situation  Psych: normal     Results Review:     I reviewed the patient's new clinical results.    Lab Results   Component Value Date    WBC 12.86 (H) 10/17/2024    HGB 10.9 (L) 10/17/2024    HCT 30.8 (L) 10/17/2024    MCV 90.6 10/17/2024     10/17/2024      Ultrasound:      The uterus measures 10.2 cm in length, 5.7 cm AP  and 10 cm transverse. The endometrial thickness is approximately 17 mm,  18 mm on the previous examination. The endometrium appears slightly less  irregular and bulbous than before, there is echogenic material which  remains within. The amount appears less. Trace amount of fluid near the  endocervical canal.     The right ovary measures 4.6 x 3.0 x 2.8 cm. Follicles demonstrated  along its surface. is normal in appearance.     The left ovary is immediately adjacent to the left side of the uterus.  There are follicles demonstrated along its surface. The ovary itself  appears to measure approximately 3.6 cm in maximum diameter. Arterial  inflow demonstrated on color Doppler and spectral analysis. Where I  would anticipate the fallopian tube to be located, there is a bulbous  soft tissue structure which measures 4.9 x 2.0 x 4.2 cm. Suspect  thickened fallopian tube, no abnormal cystic structure to suggest  ectopic. There is a 2 cm irregular fluid collection interposed between  perhaps hydro or pyosalpinx. Ovarian mass is felt to be less likely.  Advise conservative surveillance.     No significant free fluid is seen within the pelvis. The remainder is  unremarkable.      Assessment & Plan       Incomplete       Patient was counseled that ultrasound shows 1mm difference in size of endometrium. Although there is some interval improvement in the irregularity, I counseled her on concerns for recurrence of infection if this is not removed via D&C. She agreed to dilation and curettage. She was counseled on the risks including but not limited to risks of anesthesia, injury to surrounding structures, infection, bleeding, uterine perforation, inability to diagnose, need for other surgeries/procedures including hysterectomy. I asked patient if she would like me to contact a family member for her and she declined.  I asked if she would like me to call anyone after surgery  and she declined.     Continue Rocephin, flagyl while inpatient.  If patient is stable, likely discharge tomorrow with PO antibiotics. Per ID - outpatient regimen of Keflex 500 mg p.o. every 6 hours and Flagyl p.o. 3 times daily x 7 days.         Plan for disposition: anticipate discharge in 1-2 days    Karine Wheeler MD  10/17/24  09:39 EDT

## 2024-10-17 NOTE — PLAN OF CARE
Goal Outcome Evaluation:           Progress: improving  Outcome Evaluation: VSS. afebrile.  small amount of brown bloody  vaginal drainage. .  last dose of methergine given.  up ad rosy.  IV antibiotics given.  transvaginal ultrasound ordered this am.  to monitor KCl per protocol.

## 2024-10-18 ENCOUNTER — TELEPHONE (OUTPATIENT)
Dept: OBSTETRICS AND GYNECOLOGY | Facility: CLINIC | Age: 24
End: 2024-10-18
Payer: MEDICAID

## 2024-10-18 VITALS
OXYGEN SATURATION: 98 % | BODY MASS INDEX: 22.35 KG/M2 | RESPIRATION RATE: 16 BRPM | HEIGHT: 68 IN | DIASTOLIC BLOOD PRESSURE: 71 MMHG | SYSTOLIC BLOOD PRESSURE: 99 MMHG | TEMPERATURE: 97.5 F | WEIGHT: 147.49 LBS | HEART RATE: 64 BPM

## 2024-10-18 LAB
DEPRECATED RDW RBC AUTO: 39.7 FL (ref 37–54)
ERYTHROCYTE [DISTWIDTH] IN BLOOD BY AUTOMATED COUNT: 12.2 % (ref 12.3–15.4)
HCT VFR BLD AUTO: 33.3 % (ref 34–46.6)
HGB BLD-MCNC: 11.1 G/DL (ref 12–15.9)
MCH RBC QN AUTO: 29.8 PG (ref 26.6–33)
MCHC RBC AUTO-ENTMCNC: 33.3 G/DL (ref 31.5–35.7)
MCV RBC AUTO: 89.3 FL (ref 79–97)
PLATELET # BLD AUTO: 319 10*3/MM3 (ref 140–450)
PMV BLD AUTO: 10.3 FL (ref 6–12)
RBC # BLD AUTO: 3.73 10*6/MM3 (ref 3.77–5.28)
WBC NRBC COR # BLD AUTO: 12.06 10*3/MM3 (ref 3.4–10.8)

## 2024-10-18 PROCEDURE — 99232 SBSQ HOSP IP/OBS MODERATE 35: CPT | Performed by: INTERNAL MEDICINE

## 2024-10-18 PROCEDURE — 85027 COMPLETE CBC AUTOMATED: CPT | Performed by: OBSTETRICS & GYNECOLOGY

## 2024-10-18 PROCEDURE — 25010000002 METRONIDAZOLE 500 MG/100ML SOLUTION: Performed by: INTERNAL MEDICINE

## 2024-10-18 PROCEDURE — 99024 POSTOP FOLLOW-UP VISIT: CPT | Performed by: OBSTETRICS & GYNECOLOGY

## 2024-10-18 RX ORDER — CEPHALEXIN 500 MG/1
500 CAPSULE ORAL EVERY 6 HOURS SCHEDULED
Status: DISCONTINUED | OUTPATIENT
Start: 2024-10-18 | End: 2024-10-18 | Stop reason: HOSPADM

## 2024-10-18 RX ORDER — CEPHALEXIN 500 MG/1
500 CAPSULE ORAL EVERY 6 HOURS SCHEDULED
Qty: 28 CAPSULE | Refills: 0 | Status: SHIPPED | OUTPATIENT
Start: 2024-10-18 | End: 2024-10-25

## 2024-10-18 RX ORDER — OXYCODONE HYDROCHLORIDE 5 MG/1
5 TABLET ORAL EVERY 4 HOURS PRN
Status: DISCONTINUED | OUTPATIENT
Start: 2024-10-18 | End: 2024-10-18 | Stop reason: HOSPADM

## 2024-10-18 RX ORDER — ACETAMINOPHEN 500 MG
1000 TABLET ORAL EVERY 6 HOURS PRN
Status: DISCONTINUED | OUTPATIENT
Start: 2024-10-18 | End: 2024-10-18 | Stop reason: HOSPADM

## 2024-10-18 RX ORDER — METRONIDAZOLE 500 MG/1
500 TABLET ORAL EVERY 8 HOURS SCHEDULED
Status: DISCONTINUED | OUTPATIENT
Start: 2024-10-18 | End: 2024-10-18 | Stop reason: HOSPADM

## 2024-10-18 RX ORDER — METRONIDAZOLE 500 MG/1
500 TABLET ORAL EVERY 8 HOURS
Qty: 17 TABLET | Refills: 0 | Status: SHIPPED | OUTPATIENT
Start: 2024-10-18 | End: 2024-10-24

## 2024-10-18 RX ADMIN — METRONIDAZOLE 500 MG: 500 TABLET, FILM COATED ORAL at 06:53

## 2024-10-18 RX ADMIN — METRONIDAZOLE 500 MG: 500 INJECTION, SOLUTION INTRAVENOUS at 00:49

## 2024-10-18 RX ADMIN — Medication 10 ML: at 09:10

## 2024-10-18 RX ADMIN — METRONIDAZOLE 500 MG: 500 TABLET, FILM COATED ORAL at 13:54

## 2024-10-18 NOTE — ANESTHESIA POSTPROCEDURE EVALUATION
"Patient: Martha Mckeon    Procedure Summary       Date: 10/17/24 Room / Location: [facility] OR [address] [place] [address] MAIN OR    Anesthesia Start:  Anesthesia Stop:     Procedure: DILATATION AND CURETTAGE WITH SUCTION (Vagina) Diagnosis:       Incomplete       (Incomplete  [O03.4])    Surgeons: Karine Wheeler MD Provider: Dwayne Bosch MD    Anesthesia Type: general ASA Status: 2 - Emergent            Anesthesia Type: general    Vitals  Vitals Value Taken Time   /81 10/17/24 2245   Temp 36.9 °C (98.4 °F) 10/17/24 2214   Pulse 70 10/17/24 2251   Resp 18 10/17/24 2230   SpO2 100 % 10/17/24 2251   Vitals shown include unfiled device data.        Post Anesthesia Care and Evaluation    Level of consciousness: awake and alert  Pain management: adequate    Airway patency: patent  Anesthetic complications: No anesthetic complications  PONV Status: none  Cardiovascular status: acceptable  Respiratory status: acceptable  Hydration status: acceptable    Comments: /79 (BP Location: Left arm, Patient Position: Lying)   Pulse 87   Temp 36.9 °C (98.4 °F) (Oral)   Resp 18   Ht 173 cm (68.11\")   Wt 66.9 kg (147 lb 7.8 oz)   LMP 2024 (Exact Date)   SpO2 100%   BMI 22.35 kg/m²       "

## 2024-10-18 NOTE — ANESTHESIA PREPROCEDURE EVALUATION
Anesthesia Evaluation     Patient summary reviewed and Nursing notes reviewed   no history of anesthetic complications:   NPO Solid Status: > 8 hours  NPO Liquid Status: > 8 hours           Airway   Mallampati: II  TM distance: >3 FB  Neck ROM: full  No difficulty expected  Dental - normal exam     Pulmonary    (-) asthma, sleep apnea, rhonchi, decreased breath sounds, wheezes, not a smoker  Cardiovascular   Exercise tolerance: good (4-7 METS)    Rhythm: regular  Rate: normal    (-) hypertension, CAD, dysrhythmias, angina, PERES, murmur      Neuro/Psych  (-) seizures, CVA  GI/Hepatic/Renal/Endo    (-) liver disease, no renal disease, diabetes, no thyroid disorder    Musculoskeletal     Abdominal     Abdomen: soft.   Substance History      OB/GYN    (+) Pregnant        Other   blood dyscrasia (hgb 10.9) anemia,                     Anesthesia Plan    ASA 2 - emergent     general     intravenous induction     Anesthetic plan, risks, benefits, and alternatives have been provided, discussed and informed consent has been obtained with: patient.      CODE STATUS:    Level Of Support Discussed With: Patient  Code Status (Patient has no pulse and is not breathing): CPR (Attempt to Resuscitate)  Medical Interventions (Patient has pulse or is breathing): Full Support

## 2024-10-18 NOTE — DISCHARGE SUMMARY
Discharge Summary    Patient Identification:  Name:   Martha Mckeon  Age:   24 y.o.  :   2000  MRN:  [id number]    Admit Date:  10/15/2024    Discharge Date:  10/18/2024    Admitting Physician:  Nicolás Verma MD    Discharge Physician:  Pancho Corona MD    Admission Diagnosis:  Endometritis    Discharge Diagnosis:  Endometritis    Discharge Condition:  Good    Hospital Course:  Patient is a 24 year old female who had spontaneous  in past 2 weeks.  She went to see her OB for follow up and clinical exam was consistent with endometritis as patient was having pain, cramping and bleeding.  She was placed on IV antibiotics, underwent D&C for treatment and was stable overall.  The day after her D&C, she requested discharge to home.  She remained afebrile and had improvement in pain and bleeding.    Procedures:  Dilation and curettage.    Current Medications:    Medications Prior to Admission   Medication Sig Dispense Refill Last Dose/Taking    [] acetaminophen (TYLENOL) 650 MG 8 hr tablet Take 1 tablet by mouth Every 8 (Eight) Hours As Needed for Mild Pain, Moderate Pain or Headache.   Taking As Needed    docusate sodium 100 MG capsule Take 1 capsule by mouth 2 (Two) Times a Day As Needed (constipation).   Taking As Needed    FeroSul 325 (65 Fe) MG tablet Take 1 tablet by mouth Daily With Breakfast.   Taking    hydrOXYzine (ATARAX) 25 MG tablet Take 1 tablet by mouth Every 8 (Eight) Hours As Needed.   Taking As Needed    ibuprofen (ADVIL,MOTRIN) 800 MG tablet Take 1 tablet by mouth Every 8 (Eight) Hours As Needed.   Taking As Needed    Prenatal Vit-Fe Fumarate-FA (KP Prenatal Multivitamins) 28-0.8 MG tablet Take 1 tablet by mouth Daily.   Taking       Discharge Condition:  Good    Discharge Disposition/Location:  Home    Pancho Corona MD

## 2024-10-18 NOTE — PROGRESS NOTES
[facility]     Progress Note    Patient Name: Martha Mckeon  : 2000  MRN: [id number]  Primary Care Physician:  Provider, No Known  Date of admission: 10/15/2024    Subjective   Subjective     Chief Complaint: pelvic pain, fever    History of Present Illness - Patient is a 24 year old female post miscarriage who was admitted for endometritis/possible retained products of conception.  She passed tissue spontaneously but her primary OB felt her exam was consistent with endometritis.  She was admitted and placed on IV antibiotics.  ID was consulted and medication was adjusted.  She subsequently underwent D&C for evaluation/removal of possible products of conception.    Patient reports feeling improved with no significant pain or bleeding.  She is wanting to go home today.    Review of Systems   Constitutional:  Negative for chills and fever.   Genitourinary:  Negative for pelvic pain and vaginal bleeding.       Objective   Objective     Vitals:   Temp:  [96.8 °F (36 °C)-99.3 °F (37.4 °C)] 97.5 °F (36.4 °C)  Heart Rate:  [] 64  Resp:  [16-18] 16  BP: ()/(65-82) 99/71  Flow (L/min) (Oxygen Therapy):  [2] 2    Physical Exam  Constitutional:       Appearance: Normal appearance. She is not ill-appearing.   Abdominal:      Tenderness: There is no guarding or rebound.   Neurological:      Mental Status: She is alert.   Psychiatric:         Mood and Affect: Mood normal.         Behavior: Behavior normal.         Thought Content: Thought content normal.         Judgment: Judgment normal.          Result Review      WBC 12.06 High  10*3/mm3   RBC 3.73 Low  10*6/mm3   Hemoglobin 11.1 Low  g/dL   Hematocrit 33.3 Low  %   MCV 89.3 fL   MCH 29.8 pg   MCHC 33.3 g/dL   RDW 12.2 Low  %   RDW-SD 39.7 fl   MPV 10.3 fL   Platelets 319 10*3/mm3     ALT (SGPT) 84 High  U/L   AST (SGOT) 58 High  U/L     ALT (SGPT) 87 High  U/L   AST (SGOT) 101 High  U/L     Assessment & Plan   Assessment / Plan     Brief Patient  Summary:  Martha Mckeon is a 24 y.o. female with endometritis/retained products of conception    Active Hospital Problems:  Active Hospital Problems    Diagnosis     **Incomplete       Plan:   -  OK to D/C home.  Discussed importance of follow up.  Patient was uncertain who her doctor was and this will be provided.  Continuity of care emphasized, given language barrier.  Will continue antibiotics as outpatient and patient to follow up in one week.    VTE Prophylaxis:  No VTE prophylaxis order currently exists.        CODE STATUS:    Level Of Support Discussed With: Patient  Code Status (Patient has no pulse and is not breathing): CPR (Attempt to Resuscitate)  Medical Interventions (Patient has pulse or is breathing): Full Support    Disposition:  I expect patient to be discharged today.    Pancho Corona MD

## 2024-10-18 NOTE — PAYOR COMM NOTE
"Lisa Gregorio (24 y.o. Female)    PLEASE SEE ATTACHED FOR DC NOTICE   REF#[id number]  THANK YOU  EDMOND COHN RN/ DEPT   [facility]  PH: [phone]  FAX:  [phone]     Date of Birth   2000    Social Security Number       Address   [address] [place] [address]    Home Phone   [phone]    MRN   [id number]       [place]ism   None    Marital Status   Single                            Admission Date   10/15/24    Admission Type   Urgent    Admitting Provider   Emily Martinez MD    Attending Provider       Department, Room/Bed   [facility] [address] [place], P699/1       Discharge Date   10/18/2024    Discharge Disposition   Home or Self Care    Discharge Destination                                 Attending Provider: (none)   Allergies: Penicillins    Isolation: None   Infection: None   Code Status: CPR    Ht: 173 cm (68.11\")   Wt: 66.9 kg (147 lb 7.8 oz)    Admission Cmt: None   Principal Problem: Incomplete  [O03.4]                   Active Insurance as of 10/15/2024       Primary Coverage       Payor Plan Insurance Group Employer/Plan Group    ANTHEM MEDICAID ANTH MEDICAID [id number]       Payor Plan Address Payor Plan Phone Number Payor Plan Fax Number Effective Dates    [address] [address] [phone]  2024 - None Entered    [facility] [address]         Subscriber Name Subscriber Birth Date Member ID       LISA GREGORIO 2000 [id number]                     Emergency Contacts        (Rel.) Home Phone Work Phone Mobile Phone    SUSHANT SOUTH (Friend) [phone] -- --    Ayden Piper (Spouse) -- -- [phone]              [place]: NPI [id number]  Tax ID [id number]     Discharge Summary        Pancho Corona MD at 10/18/24 1322          Discharge Summary    Patient Identification:  Name:   Lisa Gregorio  Age:   24 y.o.  :   2000  MRN:  [id number]    Admit Date:  " 10/15/2024    Discharge Date:  10/18/2024    Admitting Physician:  Nicolás Verma MD    Discharge Physician:  Pancho Corona MD    Admission Diagnosis:  Endometritis    Discharge Diagnosis:  Endometritis    Discharge Condition:  Good    Hospital Course:  Patient is a 24 year old female who had spontaneous  in past 2 weeks.  She went to see her OB for follow up and clinical exam was consistent with endometritis as patient was having pain, cramping and bleeding.  She was placed on IV antibiotics, underwent D&C for treatment and was stable overall.  The day after her D&C, she requested discharge to home.  She remained afebrile and had improvement in pain and bleeding.    Procedures:  Dilation and curettage.    Current Medications:    Medications Prior to Admission   Medication Sig Dispense Refill Last Dose/Taking    [] acetaminophen (TYLENOL) 650 MG 8 hr tablet Take 1 tablet by mouth Every 8 (Eight) Hours As Needed for Mild Pain, Moderate Pain or Headache.   Taking As Needed    docusate sodium 100 MG capsule Take 1 capsule by mouth 2 (Two) Times a Day As Needed (constipation).   Taking As Needed    FeroSul 325 (65 Fe) MG tablet Take 1 tablet by mouth Daily With Breakfast.   Taking    hydrOXYzine (ATARAX) 25 MG tablet Take 1 tablet by mouth Every 8 (Eight) Hours As Needed.   Taking As Needed    ibuprofen (ADVIL,MOTRIN) 800 MG tablet Take 1 tablet by mouth Every 8 (Eight) Hours As Needed.   Taking As Needed    Prenatal Vit-Fe Fumarate-FA (KP Prenatal Multivitamins) 28-0.8 MG tablet Take 1 tablet by mouth Daily.   Taking       Discharge Condition:  Good    Discharge Disposition/Location:  Home    Pancho Corona MD     Electronically signed by Pancho Corona MD at 10/18/24 0280

## 2024-10-18 NOTE — OP NOTE
OPERATIVE NOTE    SURGEON: Karine Wheeler MD     PREOPERATIVE DIAGNOSIS:  1. Second trimester loss  2. Possible retained products  3. Endometritis    POSTOPERATIVE DIAGNOSIS:  1. same    ANESTEHSIA: GETA    OPERATION: Suction dilation and curettage under ultrasound guidance.     ESTIMATED BLOOD LOSS: 50mL     COMPLICATIONS: None.    DRAINS: None.    SPECIMENS: retained placenta    COUNTS: Sponge, instrument, lap and needle counts were correct x2.    INDICATIONS: The patient is a 24 y.o. female  who had a 15w spontaneous pregnancy loss at an outside facility. She then presented  with pelvic pain and bleeding, fever to 101.  She was admitted and infection symptoms improved on IV antibiotics as did bleeding. Based on US on HD 3, there was still some thickening in the endometrial cavity and given concern for recurrence of infection if there were retained products patient was counseled and agreed to Dilation and Curettage and was consented for this procedure.The risks, benefits, alternatives and indications for the procedure were discussed with the patient. The patient voiced understanding and desired to proceed. All questions were answered.    FINDINGS: A preoperative ultrasound noted a thickened endometrial stripe at the fundus . A bimanual exam revealed a 1cm dilaterd cervix with an anteverted uterus of about 10 weeks in size. There was no active vaginal bleeding. A small amount of uterine contents was evacuated during the suction D&C. A postoperative ultrasound was performed that noted a clean endometrial stripe.    OPERATIVE PROCEDURE IN DETAIL: After informed consent was obtained, the patient was taken to the operating room and placed in the dorsal supine position.  General anesthesia was administered. She was then placed in the dorsal lithotomy position using candy cane stirrups. She was prepped and draped in the usual sterile fashion, and an in-and-out catheterization was performed. The patient received  doxycycline, and SCDs were placed. An open-sided speculum was placed in the vagina. The anterior lip of the cervix was grasped with  an Allis clamp. The cervix was brought down in a caudal direction. The cervix was serially dilated using Adrián dilators up to a 20 Gabonese. An 8 mm curved suction curette was then placed in the uterine cavity and passed easily to the fundus under ultrasound. Using appropriate pressure, the suction was applied, and a small amount of uterine contents consistent with products of conception was removed from the uterus. After 3  passes using the suction curette, a nonserrated banjo curette was used. A 360 degree curettage was performed using the nonserrated banjo currette, until a gritty texture was noted. The suction curette was reintroduced and minimal blood was obtained. A post procedure ultrasound was performed that noted a normal-appearing endometrial stripe. The  Allis  was removed, and there was no bleeding noted.  The patient tolerated the procedure well and was transferred to the PACU in stable condition.

## 2024-10-18 NOTE — NURSING NOTE
Patient was given discharge instructions via  ipad in Panamanian.  Paper instructions were given to her in Panamanian. I went over how to take her antibiotics, and she had them from the retail pharmacy prior to leaving.  All questions answered.  Patient stated she and her male visitor were going to get a taxi to get home.  Assisted pt to entrance in w/c with assistant.

## 2024-10-18 NOTE — PROGRESS NOTES
LOS: 3 days     Chief Complaint: Endometritis    Interval History: Afebrile, status post D&C yesterday given concern for retained products of conception.  Patient tolerated the procedure well.    Vital Signs  Temp:  [96.8 °F (36 °C)-99.3 °F (37.4 °C)] 97.6 °F (36.4 °C)  Heart Rate:  [] 98  Resp:  [16-18] 18  BP: ()/(65-82) 100/69    Physical Exam:  General: In no acute distress  Cardiovascular: RRR, no lower extremity edema  Respiratory: Breathing comfortably on room air  GI: Soft, NT/ND, + bowel sounds bilaterally  Skin: No rashes     Antibiotics:  Ceftriaxone 2 g IV every 24 hours  Flagyl 500 mg IV every 8hours     Results Review:    Lab Results   Component Value Date    WBC 12.06 (H) 10/18/2024    HGB 11.1 (L) 10/18/2024    HCT 33.3 (L) 10/18/2024    MCV 89.3 10/18/2024     10/18/2024     Lab Results   Component Value Date    GLUCOSE 100 (H) 10/17/2024    BUN 7 10/17/2024    CREATININE 0.69 10/17/2024    BCR 10.1 10/17/2024    CO2 25.2 10/17/2024    CALCIUM 8.6 10/17/2024    ALBUMIN 2.5 (L) 10/17/2024    AST 58 (H) 10/17/2024    ALT 84 (H) 10/17/2024       Microbiology:  10/15 BCx NGTD x 2     Assessment & Plan   Endometritis  Possible retained products of conception?  Status post second trimester fetal loss  Leukocytosis  Elevated LFTs    Patient remains afebrile.  White blood cell count remains mildly elevated with a component of this could be reactive in the setting of yesterday's procedure.  Blood cultures remain negative today.  Continue ceftriaxone and Flagyl while in the hospital at discharge okay to transition to Keflex 500 mg p.o. every 6 hours and Flagyl p.o. 3 times a day to complete a 7-day course of antibiotics.    Patient is okay for discharge from an ID standpoint at any time

## 2024-10-18 NOTE — TELEPHONE ENCOUNTER
hospital calling requesting pt be scheduled for 1 wk f/u with Dr. Verma.   Pt scheduled at [facility] for gy/fu 10/24. Please let me know if she needs to be rescheduled for a different day. Thanks!

## 2024-10-18 NOTE — ANESTHESIA PROCEDURE NOTES
Airway  Urgency: elective    Date/Time: 10/17/2024 9:37 PM  Airway not difficult    General Information and Staff    Patient location during procedure: OR  Anesthesiologist: Dwayne Bosch MD  CRNA/CAA: Karma Moses CRNA    Indications and Patient Condition  Indications for airway management: airway protection    Preoxygenated: yes  Mask difficulty assessment: 0 - not attempted    Final Airway Details  Final airway type: endotracheal airway      Successful airway: ETT  Cuffed: yes   Successful intubation technique: direct laryngoscopy and RSI  Facilitating devices/methods: intubating stylet  Endotracheal tube insertion site: oral  Blade: Hassan  Blade size: 2  ETT size (mm): 7.0  Cormack-Lehane Classification: grade I - full view of glottis  Placement verified by: chest auscultation and capnometry   Cuff volume (mL): 6  Measured from: teeth  ETT/EBT  to teeth (cm): 20  Number of attempts at approach: 1  Assessment: lips, teeth, and gum same as pre-op and atraumatic intubation             None

## 2024-10-18 NOTE — PROGRESS NOTES
Case Management Discharge Note      Final Note: Discharged home. Neelima Armas RN         Selected Continued Care - Discharged on 10/18/2024 Admission date: 10/15/2024 - Discharge disposition: Home or Self Care         Transportation Services  Private: Car    Final Discharge Disposition Code: 01 - home or self-care

## 2024-10-18 NOTE — PLAN OF CARE
Problem:  Loss  Goal: Optimal Adjustment to Loss  Outcome: Progressing     Problem: Infection  Goal: Absence of Infection Signs and Symptoms  Outcome: Progressing     Problem: Sepsis/Septic Shock  Goal: Absence of Infection Signs and Symptoms  Outcome: Progressing   Goal Outcome Evaluation:  Plan of Care Reviewed With: patient        Progress: improving  Outcome Evaluation: Pt had D/C performed, small amount of bleeding, pt denies pain post procedure, alert and oriented x4, coop with care, communicated using , tolerating iv antibiotics, up ad rosy, voiding and eating post procedure without any difficulty, family at bedside.

## 2024-10-20 LAB
BACTERIA SPEC AEROBE CULT: NORMAL
BACTERIA SPEC AEROBE CULT: NORMAL

## 2024-10-21 LAB
CYTO UR: NORMAL
LAB AP CASE REPORT: NORMAL
LAB AP DIAGNOSIS COMMENT: NORMAL
PATH REPORT.FINAL DX SPEC: NORMAL
PATH REPORT.GROSS SPEC: NORMAL

## 2024-10-28 ENCOUNTER — OFFICE VISIT (OUTPATIENT)
Dept: OBSTETRICS AND GYNECOLOGY | Facility: CLINIC | Age: 24
End: 2024-10-28
Payer: MEDICAID

## 2024-10-28 VITALS
SYSTOLIC BLOOD PRESSURE: 104 MMHG | BODY MASS INDEX: 22.28 KG/M2 | DIASTOLIC BLOOD PRESSURE: 72 MMHG | HEART RATE: 82 BPM | HEIGHT: 68 IN | WEIGHT: 147 LBS

## 2024-10-28 DIAGNOSIS — Z30.011 OCP (ORAL CONTRACEPTIVE PILLS) INITIATION: ICD-10-CM

## 2024-10-28 DIAGNOSIS — N71.9 ENDOMETRITIS: Primary | ICD-10-CM

## 2024-10-28 RX ORDER — NORGESTIMATE AND ETHINYL ESTRADIOL 0.25-0.035
1 KIT ORAL DAILY
Qty: 84 TABLET | Refills: 3 | Status: SHIPPED | OUTPATIENT
Start: 2024-10-28

## 2024-10-28 NOTE — PROGRESS NOTES
"Subjective    is a 24 y.o. female here for Post op exam. Pt is s/p D&C for missed AB on 10/17/2024.  Pt without c/o's.     Chief Complaint   Patient presents with    Post-op        HPI    24 y.o.    Seen 10/15/24 and sent for admission for septic ab vs endometritis - underwent antibiotic treatment and D&C.   Here for follow up and feeling much better. No residual concerns      Review of Systems   Constitutional:  Negative for fever.   Respiratory:  Negative for shortness of breath.    Cardiovascular:  Negative for chest pain.   Gastrointestinal:  Negative for abdominal pain.   Genitourinary:  Negative for pelvic pain and vaginal bleeding.        Objective   /72   Pulse 82   Ht 172.7 cm (68\")   Wt 66.7 kg (147 lb)   LMP 2024 (Exact Date)   Breastfeeding No   BMI 22.35 kg/m²   Physical Exam  Constitutional:       General: She is not in acute distress.     Appearance: Normal appearance. She is well-developed and normal weight.   Neck:      Thyroid: No thyromegaly.   Pulmonary:      Effort: No respiratory distress.   Abdominal:      General: Abdomen is flat. There is no distension.      Palpations: Abdomen is soft.      Tenderness: There is no abdominal tenderness.   Musculoskeletal:      Right lower leg: No edema.      Left lower leg: No edema.   Neurological:      Mental Status: She is alert and oriented to person, place, and time.   Skin:     General: Skin is warm and dry.   Psychiatric:         Behavior: Behavior normal.         Thought Content: Thought content normal.         Judgment: Judgment normal.   Vitals reviewed.        Path no POC, implantation site tissue seen.     Assessment/Plan   Diagnoses and all orders for this visit:    1. Endometritis (Primary)    2. OCP (oral contraceptive pills) initiation    Other orders  -     norgestimate-ethinyl estradiol (MonoNessa) 0.25-35 MG-MCG per tablet; Take 1 tablet by mouth Daily.  Dispense: 84 tablet; Refill: 3    1) Endometritis,   Appears " to have resolved  Post D&C with no residual tissue.   So for now just needs to allow her body to continue to recover.   No further restrictions  Okay to work, normal activity     2) Discussed options for contraception   Wants to do OCP and consider pregnancy in one year    Reviewed how to start oral contraceptives.     Discussed timing that is best (on cycle or abstinent period)   Reviewed how to be consistent with taking pills   Reviewed common side effects   Reviewed life threatening complications like VTE or stoke  Discussed efficiency of this contraceptive and things that reduce the efficiency     Nicolás Verma MD   10/28/2024  12:23 EDT

## (undated) DEVICE — TBG W FLTR FOR BERKELEY SYSTEM

## (undated) DEVICE — SACK GZ PERM

## (undated) DEVICE — ST COL BERKELY TBG

## (undated) DEVICE — VACURETTE CRV RIGD 8MM DISP

## (undated) DEVICE — LOU D & C HYSTEROSCOPY: Brand: MEDLINE INDUSTRIES, INC.

## (undated) DEVICE — HOSE BT TO BT VAC CURETTAGE SNGL PT USE EA/10

## (undated) DEVICE — DRAPE,REIN 53X77,STERILE: Brand: MEDLINE

## (undated) DEVICE — CANSTR SXN UTER NO FLTR SURG

## (undated) DEVICE — GLV SURG BIOGEL LTX PF 6

## (undated) DEVICE — Device